# Patient Record
Sex: MALE | Race: WHITE | NOT HISPANIC OR LATINO | Employment: FULL TIME | URBAN - NONMETROPOLITAN AREA
[De-identification: names, ages, dates, MRNs, and addresses within clinical notes are randomized per-mention and may not be internally consistent; named-entity substitution may affect disease eponyms.]

---

## 2022-11-09 ENCOUNTER — LAB (OUTPATIENT)
Dept: LAB | Facility: HOSPITAL | Age: 27
End: 2022-11-09

## 2022-11-09 ENCOUNTER — OFFICE VISIT (OUTPATIENT)
Dept: FAMILY MEDICINE CLINIC | Facility: CLINIC | Age: 27
End: 2022-11-09

## 2022-11-09 VITALS
OXYGEN SATURATION: 96 % | WEIGHT: 315 LBS | HEART RATE: 137 BPM | HEIGHT: 72 IN | SYSTOLIC BLOOD PRESSURE: 135 MMHG | BODY MASS INDEX: 42.66 KG/M2 | TEMPERATURE: 98.5 F | DIASTOLIC BLOOD PRESSURE: 88 MMHG

## 2022-11-09 DIAGNOSIS — J03.90 TONSILLITIS: ICD-10-CM

## 2022-11-09 DIAGNOSIS — J03.00 STREP TONSILLITIS: Primary | ICD-10-CM

## 2022-11-09 DIAGNOSIS — R50.9 FEVER, UNSPECIFIED FEVER CAUSE: ICD-10-CM

## 2022-11-09 LAB
FLUAV AG NPH QL: NEGATIVE
FLUBV AG NPH QL IA: NEGATIVE
S PYO AG THROAT QL: POSITIVE

## 2022-11-09 PROCEDURE — 87880 STREP A ASSAY W/OPTIC: CPT

## 2022-11-09 PROCEDURE — 99203 OFFICE O/P NEW LOW 30 MIN: CPT | Performed by: NURSE PRACTITIONER

## 2022-11-09 PROCEDURE — 87804 INFLUENZA ASSAY W/OPTIC: CPT

## 2022-11-09 PROCEDURE — 96372 THER/PROPH/DIAG INJ SC/IM: CPT | Performed by: NURSE PRACTITIONER

## 2022-11-09 RX ORDER — CEFTRIAXONE 1 G/1
1 INJECTION, POWDER, FOR SOLUTION INTRAMUSCULAR; INTRAVENOUS EVERY 24 HOURS
Status: COMPLETED | OUTPATIENT
Start: 2022-11-09 | End: 2022-11-09

## 2022-11-09 RX ORDER — AMOXICILLIN AND CLAVULANATE POTASSIUM 875; 125 MG/1; MG/1
1 TABLET, FILM COATED ORAL 2 TIMES DAILY
Qty: 20 TABLET | Refills: 0 | Status: SHIPPED | OUTPATIENT
Start: 2022-11-09 | End: 2023-02-21

## 2022-11-09 RX ORDER — DEXAMETHASONE SODIUM PHOSPHATE 4 MG/ML
8 INJECTION, SOLUTION INTRA-ARTICULAR; INTRALESIONAL; INTRAMUSCULAR; INTRAVENOUS; SOFT TISSUE ONCE
Status: COMPLETED | OUTPATIENT
Start: 2022-11-09 | End: 2022-11-09

## 2022-11-09 RX ADMIN — CEFTRIAXONE 1 G: 1 INJECTION, POWDER, FOR SOLUTION INTRAMUSCULAR; INTRAVENOUS at 17:23

## 2022-11-09 RX ADMIN — DEXAMETHASONE SODIUM PHOSPHATE 8 MG: 4 INJECTION, SOLUTION INTRA-ARTICULAR; INTRALESIONAL; INTRAMUSCULAR; INTRAVENOUS; SOFT TISSUE at 17:36

## 2022-11-09 NOTE — PROGRESS NOTES
After obtaining consent, and per orders of Lisbeth BAILEY, injection of Rocephin 1gm given by Rachana Contreras LPN. Patient instructed to remain in clinic for 20 minutes afterwards, and to report any adverse reaction to me immediately. Pt tolerated well with no adverse reactions.      Patient will come to 's office to : prescription.   Patient was advised of location and hours: Yes.   Patient was advised to bring photo identification: Yes.   Patient elects another party to  item: no.

## 2022-11-09 NOTE — PROGRESS NOTES
"Chief Complaint  Sore Throat, Fever, and Generalized Body Aches    Subjective    History of Present Illness      Patient presents to [facility] for   History of Present Illness  Pt is here today with complaints of swollen tonsils, fatigue and fever that began 11/4 and ramped up significantly on 11/7.  Pt states girlfriend and daughter were tested positive for the flu last week       Review of Systems   Constitutional: Positive for fever.   HENT: Positive for sore throat.    Musculoskeletal: Positive for myalgias.       I have reviewed and agree with the HPI and ROS information as above.  ALONSO Luke     Objective   Vital Signs:   /88   Pulse (!) 137   Temp 98.5 °F (36.9 °C) (Temporal)   Ht 182.9 cm (72\")   Wt (!) 152 kg (334 lb)   SpO2 96%   BMI 45.30 kg/m²         Physical Exam  Constitutional:       Appearance: He is well-developed. He is morbidly obese.   HENT:      Head: Normocephalic and atraumatic.      Right Ear: Tympanic membrane, ear canal and external ear normal.      Left Ear: Tympanic membrane, ear canal and external ear normal.      Nose: Nose normal. No septal deviation, nasal tenderness or congestion.      Mouth/Throat:      Lips: Pink. No lesions.      Mouth: Mucous membranes are moist. No oral lesions.      Dentition: Normal dentition.      Pharynx: Oropharynx is clear. Posterior oropharyngeal erythema present. No pharyngeal swelling or oropharyngeal exudate.      Tonsils: Tonsillar exudate present. 3+ on the right. 3+ on the left.   Eyes:      General: Lids are normal. Vision grossly intact. No scleral icterus.        Right eye: No discharge.         Left eye: No discharge.      Extraocular Movements: Extraocular movements intact.      Conjunctiva/sclera: Conjunctivae normal.      Right eye: Right conjunctiva is not injected.      Left eye: Left conjunctiva is not injected.      Pupils: Pupils are equal, round, and reactive to light.   Neck:      " Thyroid: No thyroid mass.      Trachea: Trachea normal.   Cardiovascular:      Rate and Rhythm: Normal rate and regular rhythm.      Heart sounds: Normal heart sounds. No murmur heard.    No gallop.   Pulmonary:      Effort: Pulmonary effort is normal.      Breath sounds: Normal breath sounds and air entry. No wheezing, rhonchi or rales.   Abdominal:      General: There is no distension.      Palpations: Abdomen is soft. There is no mass.      Tenderness: There is no abdominal tenderness. There is no right CVA tenderness, left CVA tenderness, guarding or rebound.   Musculoskeletal:         General: No tenderness or deformity. Normal range of motion.      Cervical back: Full passive range of motion without pain, normal range of motion and neck supple.      Thoracic back: Normal.      Right lower leg: No edema.      Left lower leg: No edema.   Skin:     General: Skin is warm and dry.      Coloration: Skin is not jaundiced.      Findings: No rash.   Neurological:      Mental Status: He is alert and oriented to person, place, and time.      Cranial Nerves: Cranial nerves are intact.      Sensory: Sensation is intact.      Motor: Motor function is intact.      Coordination: Coordination is intact.      Gait: Gait is intact.      Deep Tendon Reflexes: Reflexes are normal and symmetric.   Psychiatric:         Mood and Affect: Mood and affect normal.         Judgment: Judgment normal.            PHQ-2 Depression Screening  Little interest or pleasure in doing things? 0-->not at all   Feeling down, depressed, or hopeless? 0-->not at all   PHQ-2 Total Score 0     PHQ-9 Depression Screening  Little interest or pleasure in doing things? 0-->not at all   Feeling down, depressed, or hopeless? 0-->not at all   Trouble falling or staying asleep, or sleeping too much?     Feeling tired or having little energy?     Poor appetite or overeating?     Feeling bad about yourself - or that you are a failure or have let yourself or your  family down?     Trouble concentrating on things, such as reading the newspaper or watching television?     Moving or speaking so slowly that other people could have noticed? Or the opposite - being so fidgety or restless that you have been moving around a lot more than usual?     Thoughts that you would be better off dead, or of hurting yourself in some way?     PHQ-9 Total Score 0   If you checked off any problems, how difficult have these problems made it for you to do your work, take care of things at home, or get along with other people?        Result Review  Data Reviewed:              Rapid Strep A Screen - Swab, Throat (11/09/2022 15:37)  Influenza Antigen, Rapid - Swab, Nasopharynx (11/09/2022 15:37)       Assessment and Plan      Problem List Items Addressed This Visit    None  Visit Diagnoses     Strep tonsillitis    -  Primary    Relevant Medications    dexamethasone (DECADRON) injection 8 mg (Start on 11/9/2022  4:00 PM)    cefTRIAXone (ROCEPHIN) injection 1 g (Start on 11/9/2022  4:00 PM)    amoxicillin-clavulanate (Augmentin) 875-125 MG per tablet    Fever, unspecified fever cause          Plan:   1. Rapid flu and strep: flu neg, strep positive.   2. Dex, karen IM. Follow with augmentin. GERM precautions discussed.   3. Fu tomorrow for recheck d/t severity of tonsillitis.         Follow Up   Return in about 1 day (around 11/10/2022).  Patient was given instructions and counseling regarding his condition or for health maintenance advice. Please see specific information pulled into the AVS if appropriate.

## 2022-11-09 NOTE — PROGRESS NOTES
After obtaining consent, and per orders of Lisbeth BAILEY, injection of Decadron 8mg given by Rachana Contreras LPN. Patient instructed to remain in clinic for 20 minutes afterwards, and to report any adverse reaction to me immediately. Pt tolerated well with no adverse reactions.

## 2024-07-08 ENCOUNTER — HOSPITAL ENCOUNTER (OUTPATIENT)
Facility: HOSPITAL | Age: 29
Setting detail: OBSERVATION
Discharge: HOME | End: 2024-07-09
Attending: FAMILY MEDICINE | Admitting: OTOLARYNGOLOGY
Payer: MEDICAID

## 2024-07-08 ENCOUNTER — APPOINTMENT (OUTPATIENT)
Dept: CT IMAGING | Facility: HOSPITAL | Age: 29
End: 2024-07-08
Payer: MEDICAID

## 2024-07-08 DIAGNOSIS — J36 PERITONSILLAR ABSCESS: Primary | ICD-10-CM

## 2024-07-08 LAB
ANION GAP SERPL CALCULATED.3IONS-SCNC: 11 MMOL/L (ref 5–15)
BASOPHILS # BLD AUTO: 0.03 10*3/MM3 (ref 0–0.2)
BASOPHILS NFR BLD AUTO: 0.2 % (ref 0–1.5)
BUN SERPL-MCNC: 8 MG/DL (ref 6–20)
BUN/CREAT SERPL: 11.1 (ref 7–25)
CALCIUM SPEC-SCNC: 9.6 MG/DL (ref 8.6–10.5)
CHLORIDE SERPL-SCNC: 97 MMOL/L (ref 98–107)
CO2 SERPL-SCNC: 28 MMOL/L (ref 22–29)
CREAT SERPL-MCNC: 0.72 MG/DL (ref 0.76–1.27)
D-LACTATE SERPL-SCNC: 1.5 MMOL/L (ref 0.5–2)
DEPRECATED RDW RBC AUTO: 41.7 FL (ref 37–54)
EGFRCR SERPLBLD CKD-EPI 2021: 126 ML/MIN/1.73
EOSINOPHIL # BLD AUTO: 0.02 10*3/MM3 (ref 0–0.4)
EOSINOPHIL NFR BLD AUTO: 0.1 % (ref 0.3–6.2)
ERYTHROCYTE [DISTWIDTH] IN BLOOD BY AUTOMATED COUNT: 13.6 % (ref 12.3–15.4)
GLUCOSE SERPL-MCNC: 121 MG/DL (ref 65–99)
HCT VFR BLD AUTO: 45.7 % (ref 37.5–51)
HGB BLD-MCNC: 15 G/DL (ref 13–17.7)
IMM GRANULOCYTES # BLD AUTO: 0.06 10*3/MM3 (ref 0–0.05)
IMM GRANULOCYTES NFR BLD AUTO: 0.4 % (ref 0–0.5)
LYMPHOCYTES # BLD AUTO: 1.68 10*3/MM3 (ref 0.7–3.1)
LYMPHOCYTES NFR BLD AUTO: 11.6 % (ref 19.6–45.3)
MAGNESIUM SERPL-MCNC: 2.1 MG/DL (ref 1.6–2.6)
MCH RBC QN AUTO: 27.3 PG (ref 26.6–33)
MCHC RBC AUTO-ENTMCNC: 32.8 G/DL (ref 31.5–35.7)
MCV RBC AUTO: 83.2 FL (ref 79–97)
MONOCYTES # BLD AUTO: 1.42 10*3/MM3 (ref 0.1–0.9)
MONOCYTES NFR BLD AUTO: 9.8 % (ref 5–12)
NEUTROPHILS NFR BLD AUTO: 11.31 10*3/MM3 (ref 1.7–7)
NEUTROPHILS NFR BLD AUTO: 77.9 % (ref 42.7–76)
NRBC BLD AUTO-RTO: 0 /100 WBC (ref 0–0.2)
PLATELET # BLD AUTO: 254 10*3/MM3 (ref 140–450)
PMV BLD AUTO: 9.1 FL (ref 6–12)
POTASSIUM SERPL-SCNC: 4 MMOL/L (ref 3.5–5.2)
RBC # BLD AUTO: 5.49 10*6/MM3 (ref 4.14–5.8)
SODIUM SERPL-SCNC: 136 MMOL/L (ref 136–145)
WBC NRBC COR # BLD AUTO: 14.52 10*3/MM3 (ref 3.4–10.8)

## 2024-07-08 PROCEDURE — 25010000002 DEXAMETHASONE SODIUM PHOSPHATE 100 MG/10ML SOLUTION: Performed by: FAMILY MEDICINE

## 2024-07-08 PROCEDURE — 83605 ASSAY OF LACTIC ACID: CPT | Performed by: FAMILY MEDICINE

## 2024-07-08 PROCEDURE — 99285 EMERGENCY DEPT VISIT HI MDM: CPT

## 2024-07-08 PROCEDURE — 96375 TX/PRO/DX INJ NEW DRUG ADDON: CPT

## 2024-07-08 PROCEDURE — G0378 HOSPITAL OBSERVATION PER HR: HCPCS

## 2024-07-08 PROCEDURE — 70491 CT SOFT TISSUE NECK W/DYE: CPT

## 2024-07-08 PROCEDURE — 25010000002 MORPHINE PER 10 MG: Performed by: FAMILY MEDICINE

## 2024-07-08 PROCEDURE — 25010000002 ONDANSETRON PER 1 MG: Performed by: FAMILY MEDICINE

## 2024-07-08 PROCEDURE — 25810000003 SODIUM CHLORIDE 0.9 % SOLUTION: Performed by: FAMILY MEDICINE

## 2024-07-08 PROCEDURE — 80048 BASIC METABOLIC PNL TOTAL CA: CPT | Performed by: FAMILY MEDICINE

## 2024-07-08 PROCEDURE — 25810000003 SODIUM CHLORIDE 0.9 % SOLUTION: Performed by: NURSE PRACTITIONER

## 2024-07-08 PROCEDURE — 85025 COMPLETE CBC W/AUTO DIFF WBC: CPT | Performed by: FAMILY MEDICINE

## 2024-07-08 PROCEDURE — 25010000002 CLINDAMYCIN 600 MG/50ML SOLUTION: Performed by: FAMILY MEDICINE

## 2024-07-08 PROCEDURE — 96361 HYDRATE IV INFUSION ADD-ON: CPT

## 2024-07-08 PROCEDURE — 42700 I&D ABSCESS PERITONSILLAR: CPT | Performed by: OTOLARYNGOLOGY

## 2024-07-08 PROCEDURE — 25510000001 IOPAMIDOL 61 % SOLUTION: Performed by: FAMILY MEDICINE

## 2024-07-08 PROCEDURE — 96365 THER/PROPH/DIAG IV INF INIT: CPT

## 2024-07-08 PROCEDURE — 83735 ASSAY OF MAGNESIUM: CPT | Performed by: FAMILY MEDICINE

## 2024-07-08 RX ORDER — OXYCODONE HCL 5 MG/5 ML
5 SOLUTION, ORAL ORAL EVERY 4 HOURS PRN
Status: DISCONTINUED | OUTPATIENT
Start: 2024-07-08 | End: 2024-07-09 | Stop reason: HOSPADM

## 2024-07-08 RX ORDER — ACETAMINOPHEN 325 MG/1
650 TABLET ORAL EVERY 6 HOURS PRN
Status: DISCONTINUED | OUTPATIENT
Start: 2024-07-08 | End: 2024-07-09 | Stop reason: HOSPADM

## 2024-07-08 RX ORDER — CHLORHEXIDINE GLUCONATE ORAL RINSE 1.2 MG/ML
15 SOLUTION DENTAL EVERY 12 HOURS SCHEDULED
Status: DISCONTINUED | OUTPATIENT
Start: 2024-07-08 | End: 2024-07-09 | Stop reason: HOSPADM

## 2024-07-08 RX ORDER — CLINDAMYCIN PHOSPHATE 900 MG/50ML
900 INJECTION, SOLUTION INTRAVENOUS EVERY 8 HOURS
Qty: 450 ML | Refills: 0 | Status: DISCONTINUED | OUTPATIENT
Start: 2024-07-09 | End: 2024-07-09 | Stop reason: HOSPADM

## 2024-07-08 RX ORDER — BUPROPION HYDROCHLORIDE 300 MG/1
300 TABLET ORAL EVERY MORNING
Status: ON HOLD | COMMUNITY
Start: 2024-06-11 | End: 2024-07-09

## 2024-07-08 RX ORDER — SODIUM CHLORIDE 9 MG/ML
100 INJECTION, SOLUTION INTRAVENOUS CONTINUOUS
Status: DISCONTINUED | OUTPATIENT
Start: 2024-07-08 | End: 2024-07-09 | Stop reason: HOSPADM

## 2024-07-08 RX ORDER — SODIUM CHLORIDE 0.9 % (FLUSH) 0.9 %
10 SYRINGE (ML) INJECTION AS NEEDED
Status: DISCONTINUED | OUTPATIENT
Start: 2024-07-08 | End: 2024-07-09 | Stop reason: HOSPADM

## 2024-07-08 RX ORDER — LIDOCAINE HYDROCHLORIDE AND EPINEPHRINE 10; 10 MG/ML; UG/ML
20 INJECTION, SOLUTION INFILTRATION; PERINEURAL ONCE
Status: COMPLETED | OUTPATIENT
Start: 2024-07-08 | End: 2024-07-08

## 2024-07-08 RX ORDER — CLINDAMYCIN PHOSPHATE 600 MG/50ML
600 INJECTION, SOLUTION INTRAVENOUS ONCE
Status: COMPLETED | OUTPATIENT
Start: 2024-07-08 | End: 2024-07-08

## 2024-07-08 RX ORDER — ONDANSETRON 2 MG/ML
4 INJECTION INTRAMUSCULAR; INTRAVENOUS ONCE
Status: COMPLETED | OUTPATIENT
Start: 2024-07-08 | End: 2024-07-08

## 2024-07-08 RX ADMIN — MORPHINE SULFATE 4 MG: 4 INJECTION, SOLUTION INTRAMUSCULAR; INTRAVENOUS at 20:25

## 2024-07-08 RX ADMIN — SODIUM CHLORIDE 100 ML/HR: 9 INJECTION, SOLUTION INTRAVENOUS at 21:55

## 2024-07-08 RX ADMIN — LIDOCAINE HYDROCHLORIDE,EPINEPHRINE BITARTRATE 20 ML: 10; .01 INJECTION, SOLUTION INFILTRATION; PERINEURAL at 20:56

## 2024-07-08 RX ADMIN — IOPAMIDOL 100 ML: 612 INJECTION, SOLUTION INTRAVENOUS at 19:04

## 2024-07-08 RX ADMIN — OXYCODONE HYDROCHLORIDE 5 MG: 5 SOLUTION ORAL at 23:15

## 2024-07-08 RX ADMIN — CLINDAMYCIN PHOSPHATE 600 MG: 600 INJECTION, SOLUTION INTRAVENOUS at 20:55

## 2024-07-08 RX ADMIN — DEXAMETHASONE SODIUM PHOSPHATE 12 MG: 10 INJECTION, SOLUTION INTRAMUSCULAR; INTRAVENOUS at 21:58

## 2024-07-08 RX ADMIN — ONDANSETRON 4 MG: 2 INJECTION INTRAMUSCULAR; INTRAVENOUS at 20:25

## 2024-07-08 RX ADMIN — SODIUM CHLORIDE 1000 ML: 9 INJECTION, SOLUTION INTRAVENOUS at 20:25

## 2024-07-08 NOTE — ED PROVIDER NOTES
Subjective   History of Present Illness  28-year-old male presents emergency room with difficulty swallowing.  He is having trouble taking and his secretions as well.  Patient symptoms have been going on for the last few days but worsening.  Patient was seen by his primary care provider and they diagnosed him with a tonsillar abscess and sent him here to the emergency room for evaluation.      Review of Systems   HENT:  Positive for drooling and trouble swallowing.    All other systems reviewed and are negative.      Past Medical History:   Diagnosis Date    Tingling sensation     legs and feet       No Known Allergies    Past Surgical History:   Procedure Laterality Date    ANKLE ARTHROSCOPY Right     KNEE ARTHROSCOPY Left        History reviewed. No pertinent family history.    Social History     Socioeconomic History    Marital status:    Tobacco Use    Smoking status: Never    Smokeless tobacco: Never   Vaping Use    Vaping status: Some Days   Substance and Sexual Activity    Alcohol use: Never    Drug use: Defer    Sexual activity: Defer           Objective   Physical Exam  Vitals and nursing note reviewed.   Constitutional:       Appearance: Normal appearance.   HENT:      Head: Normocephalic and atraumatic.      Mouth/Throat:      Mouth: Mucous membranes are moist.      Pharynx: Uvula swelling present.      Tonsils: Tonsillar abscess present.   Eyes:      Extraocular Movements: Extraocular movements intact.      Pupils: Pupils are equal, round, and reactive to light.   Cardiovascular:      Rate and Rhythm: Normal rate and regular rhythm.      Heart sounds: Normal heart sounds.   Pulmonary:      Effort: Pulmonary effort is normal.      Breath sounds: Normal breath sounds.   Abdominal:      General: Bowel sounds are normal.      Palpations: Abdomen is soft.      Tenderness: There is no abdominal tenderness.   Skin:     General: Skin is warm and dry.   Neurological:      General: No focal deficit present.       Mental Status: He is alert and oriented to person, place, and time.   Psychiatric:         Mood and Affect: Mood normal.         Behavior: Behavior normal.         Procedures           ED Course                                           Lab Results (last 24 hours)       Procedure Component Value Units Date/Time    CBC & Differential [[id number]]  (Abnormal) Collected: 07/08/24 1802    Specimen: Blood Updated: 07/08/24 1816    Narrative:      The following orders were created for panel order CBC & Differential.  Procedure                               Abnormality         Status                     ---------                               -----------         ------                     CBC Auto Differential[[id number]]        Abnormal            Final result                 Please view results for these tests on the individual orders.    Basic Metabolic Panel [[id number]]  (Abnormal) Collected: 07/08/24 1802    Specimen: Blood Updated: 07/08/24 1836     Glucose 121 mg/dL      BUN 8 mg/dL      Creatinine 0.72 mg/dL      Sodium 136 mmol/L      Potassium 4.0 mmol/L      Comment: Slight hemolysis detected by analyzer. Result may be falsely elevated.        Chloride 97 mmol/L      CO2 28.0 mmol/L      Calcium 9.6 mg/dL      BUN/Creatinine Ratio 11.1     Anion Gap 11.0 mmol/L      eGFR 126.0 mL/min/1.73     Narrative:      GFR Normal >60  Chronic Kidney Disease <60  Kidney Failure <15      Lactic Acid, Plasma [[id number]]  (Normal) Collected: 07/08/24 1802    Specimen: Blood Updated: 07/08/24 1830     Lactate 1.5 mmol/L     Magnesium [[id number]]  (Normal) Collected: 07/08/24 1802    Specimen: Blood Updated: 07/08/24 1833     Magnesium 2.1 mg/dL     CBC Auto Differential [[id number]]  (Abnormal) Collected: 07/08/24 1802    Specimen: Blood Updated: 07/08/24 1816     WBC 14.52 10*3/mm3      RBC 5.49 10*6/mm3      Hemoglobin 15.0 g/dL      Hematocrit 45.7 %      MCV 83.2 fL      MCH 27.3 pg      MCHC 32.8 g/dL      RDW 13.6 %       RDW-SD 41.7 fl      MPV 9.1 fL      Platelets 254 10*3/mm3      Neutrophil % 77.9 %      Lymphocyte % 11.6 %      Monocyte % 9.8 %      Eosinophil % 0.1 %      Basophil % 0.2 %      Immature Grans % 0.4 %      Neutrophils, Absolute 11.31 10*3/mm3      Lymphocytes, Absolute 1.68 10*3/mm3      Monocytes, Absolute 1.42 10*3/mm3      Eosinophils, Absolute 0.02 10*3/mm3      Basophils, Absolute 0.03 10*3/mm3      Immature Grans, Absolute 0.06 10*3/mm3      nRBC 0.0 /100 WBC           CT Soft Tissue Neck With Contrast   Final Result   1. Left tonsillar abscess measuring at least 3.3 x 3.0 x 2.2 cm with   inflammatory edematous changes extending inferiorly to the level of the   left piriform sinus. Prominent bilateral adenoidal and tonsillar   hypertrophy with the left tonsil abscess resulting in near obliteration   of the nasopharyngeal airway. Oropharyngeal airway remains patent.   Enlarged bilateral cervical lymph nodes, likely reactive given the   extent of inflammation present.   2. Opacified left maxillary sinus. 1.6 cm right maxillary mucous   retention cyst.       This report was signed and finalized on 7/8/2024 7:22 PM by Dr. Arely Jackson MD.            Medications   sodium chloride 0.9 % flush 10 mL (has no administration in time range)   dexAMETHasone (DECADRON) IVPB 12 mg (has no administration in time range)   clindamycin (CLEOCIN) 900 mg in dextrose 5% 50 mL IVPB (premix) (has no administration in time range)   oxyCODONE (ROXICODONE) 5 MG/5ML solution 5 mg (has no administration in time range)   acetaminophen (TYLENOL) tablet 650 mg (has no administration in time range)   sodium chloride 0.9 % infusion (has no administration in time range)   chlorhexidine (PERIDEX) 0.12 % solution 15 mL (has no administration in time range)   iopamidol (ISOVUE-300) 61 % injection 100 mL (100 mL Intravenous Given 7/8/24 1904)   sodium chloride 0.9 % bolus 1,000 mL (1,000 mL Intravenous New Bag 7/8/24 2025)   ondansetron  (ZOFRAN) injection 4 mg (4 mg Intravenous Given 7/8/24 2025)   morphine injection 4 mg (4 mg Intravenous Given 7/8/24 2025)   clindamycin (CLEOCIN) 600 mg in dextrose 5% 50 mL IVPB (premix) (600 mg Intravenous New Bag 7/8/24 2055)   lidocaine 1% - EPINEPHrine 1:775862 (XYLOCAINE W/EPI) 1 %-1:652516 injection 20 mL (20 mL Injection Given 7/8/24 2056)       Medical Decision Making  28-year-old male was found had a peritonsillar abscess.  Dr. Nguyễn Valenzuela has evaluated the patient here in the emergency room.  Patient will be admitted to their services at this time.  Patient was stable at time of admission.  All questions were answered for the patient prior to admission.    Problems Addressed:  Peritonsillar abscess: complicated acute illness or injury    Amount and/or Complexity of Data Reviewed  Labs: ordered. Decision-making details documented in ED Course.  Radiology: ordered. Decision-making details documented in ED Course.  Discussion of management or test interpretation with external provider(s): Dr. Tino Valenzuela, ENT on-call    Risk  Prescription drug management.  Decision regarding hospitalization.        Final diagnoses:   Peritonsillar abscess       ED Disposition  ED Disposition       ED Disposition   Decision to Admit    Condition   --    Comment   Level of Care: Med/Surg [1]   Diagnosis: Peritonsillar abscess [475.ICD-9-CM]   Admitting Physician: TINO VALENZUELA [[id number]]                 No follow-up provider specified.       Medication List      No changes were made to your prescriptions during this visit.            Alireza Zaragoza MD  07/08/24 8102

## 2024-07-08 NOTE — LETTER
July 9, 2024     Patient: Pierre Mosley   YOB: 1995   Date of Visit: 7/8/2024       To Whom It May Concern:    Pierre Mosley was admitted at [facility] 7/8/2024-7/9/2024            Sincerely,        No name on file    CC: No Recipients

## 2024-07-09 VITALS
OXYGEN SATURATION: 100 % | HEART RATE: 82 BPM | HEIGHT: 73 IN | TEMPERATURE: 98 F | DIASTOLIC BLOOD PRESSURE: 72 MMHG | WEIGHT: 280 LBS | RESPIRATION RATE: 18 BRPM | BODY MASS INDEX: 37.11 KG/M2 | SYSTOLIC BLOOD PRESSURE: 122 MMHG

## 2024-07-09 DIAGNOSIS — J36 PERITONSILLAR ABSCESS: Primary | ICD-10-CM

## 2024-07-09 PROCEDURE — 99024 POSTOP FOLLOW-UP VISIT: CPT | Performed by: NURSE PRACTITIONER

## 2024-07-09 PROCEDURE — G0378 HOSPITAL OBSERVATION PER HR: HCPCS

## 2024-07-09 PROCEDURE — 96361 HYDRATE IV INFUSION ADD-ON: CPT

## 2024-07-09 PROCEDURE — 25010000002 CLINDAMYCIN 900 MG/50ML SOLUTION: Performed by: NURSE PRACTITIONER

## 2024-07-09 PROCEDURE — 25810000003 SODIUM CHLORIDE 0.9 % SOLUTION: Performed by: NURSE PRACTITIONER

## 2024-07-09 RX ORDER — OXYCODONE HCL 5 MG/5 ML
5 SOLUTION, ORAL ORAL EVERY 4 HOURS PRN
Qty: 90 ML | Refills: 0 | Status: SHIPPED | OUTPATIENT
Start: 2024-07-09 | End: 2024-07-12

## 2024-07-09 RX ORDER — CLINDAMYCIN PALMITATE HYDROCHLORIDE 75 MG/5ML
300 SOLUTION ORAL EVERY 8 HOURS SCHEDULED
Qty: 600 ML | Refills: 0 | Status: SHIPPED | OUTPATIENT
Start: 2024-07-09 | End: 2024-07-19

## 2024-07-09 RX ORDER — CHLORHEXIDINE GLUCONATE ORAL RINSE 1.2 MG/ML
15 SOLUTION DENTAL 2 TIMES DAILY
Qty: 473 ML | Refills: 0 | Status: SHIPPED | OUTPATIENT
Start: 2024-07-09 | End: 2024-07-25

## 2024-07-09 RX ADMIN — CHLORHEXIDINE GLUCONATE 0.12% ORAL RINSE 15 ML: 1.2 LIQUID ORAL at 08:57

## 2024-07-09 RX ADMIN — SODIUM CHLORIDE 100 ML/HR: 9 INJECTION, SOLUTION INTRAVENOUS at 08:56

## 2024-07-09 RX ADMIN — CLINDAMYCIN PHOSPHATE 900 MG: 900 INJECTION, SOLUTION INTRAVENOUS at 04:58

## 2024-07-09 NOTE — DISCHARGE SUMMARY
[facility]         DISCHARGE SUMMARY    Patient Name: Pierre Mosley  : 1995  MRN: [id number]    Date of Admission: 2024  Date of Discharge:  2024  Primary Care Physician: Mauricio Deluna MD    Consults       No orders found for last 30 day(s).            Surgical Procedures Since Admission:      Presenting Problem:   Peritonsillar abscess [J36]    Active and Resolved Hospital Problems:  Active Hospital Problems    Diagnosis POA    **Peritonsillar abscess [J36] Yes      Resolved Hospital Problems   No resolved problems to display.         Hospital Course     Hospital Course:  Pierre Mosley is a 28 y.o. male who presented to the ER last night with a sore throat for 3 days. Ct revealed left peritonsillar abscess. Abscess was drained and patient was admitted overnight for IV antibiotics and fluids. This morning, patient admits significant improvement to symptoms. Voice and swelling markedly improved on examination today.     Day of Discharge     Vital Signs:  Temp:  [96.2 °F (35.7 °C)-99 °F (37.2 °C)] 98 °F (36.7 °C)  Heart Rate:  [] 82  Resp:  [16-18] 18  BP: (122-159)/(65-92) 122/72  Output by Drain (mL) 24 0701 - 24 1900 24 1901 - 24 0700 24 0701 - 24 1227 Range Total   Patient has no LDAs of requested type attached.     Physical Exam:  Physical Exam  Vitals reviewed.   Constitutional:       Appearance: Normal appearance. He is obese.      Comments: Voice markedly improved   HENT:      Head: Normocephalic.      Right Ear: External ear normal.      Left Ear: External ear normal.      Nose: Nose normal.      Mouth/Throat:        Comments: Swelling of the left tonsil markedly reduced  Neck:      Comments: Slight tenderness to left neck on palpation  Neurological:      Mental Status: He is alert.   Psychiatric:         Behavior: Behavior is cooperative.          Pertinent  and/or Most Recent Results     LAB RESULTS:      Lab 24  4063    WBC 14.52*   HEMOGLOBIN 15.0   HEMATOCRIT 45.7   PLATELETS 254   NEUTROS ABS 11.31*   IMMATURE GRANS (ABS) 0.06*   LYMPHS ABS 1.68   MONOS ABS 1.42*   EOS ABS 0.02   MCV 83.2   LACTATE 1.5         Lab 07/08/24  1802   SODIUM 136   POTASSIUM 4.0   CHLORIDE 97*   CO2 28.0   ANION GAP 11.0   BUN 8   CREATININE 0.72*   EGFR 126.0   GLUCOSE 121*   CALCIUM 9.6   MAGNESIUM 2.1               Brief Urine Lab Results       None          Microbiology Results (last 10 days)       ** No results found for the last 240 hours. **        CT Soft Tissue Neck With Contrast    Result Date: 7/8/2024  Impression: 1. Left tonsillar abscess measuring at least 3.3 x 3.0 x 2.2 cm with inflammatory edematous changes extending inferiorly to the level of the left piriform sinus. Prominent bilateral adenoidal and tonsillar hypertrophy with the left tonsil abscess resulting in near obliteration of the nasopharyngeal airway. Oropharyngeal airway remains patent. Enlarged bilateral cervical lymph nodes, likely reactive given the extent of inflammation present. 2. Opacified left maxillary sinus. 1.6 cm right maxillary mucous retention cyst.  This report was signed and finalized on 7/8/2024 7:22 PM by Dr. Arely Jackson MD.       Labs Pending at Discharge:      Discharge Details        Discharge Medications        New Medications        Instructions Start Date   chlorhexidine 0.12 % solution  Commonly known as: PERIDEX   Swish and spit 15 mL by mouth 2 (Two) Times a Day for 7 days.      clindamycin 75 MG/5ML solution  Commonly known as: Cleocin   300 mg, Oral, Every 8 Hours Scheduled      oxyCODONE 5 MG/5ML solution  Commonly known as: ROXICODONE   5 mg, Oral, Every 4 Hours PRN             Continue These Medications        Instructions Start Date   ibuprofen 200 MG tablet  Commonly known as: ADVIL,MOTRIN   200 mg, Oral, Every 6 Hours PRN      multivitamin with minerals tablet tablet   1 tablet, Oral, Daily               No Known  Allergies      Discharge Disposition:      Diet:  Hospital:  Diet Order   Procedures    Diet: Regular/House; Texture: Soft to Chew (NDD 3); Soft to Chew: Whole Meat; Fluid Consistency: Thin (IDDSI 0)       Discharge Activity:   Clindamycin PO  Roxicodone, tylenol, motrin for pain control  Peridex  Will f/u in clinic in 2 weeks    No future appointments.        Time spent on Discharge including face to face service:  10 minutes    ALONSO Bernstein

## 2024-07-09 NOTE — H&P
[facility]   PREOPERATIVE HISTORY AND PHYSICAL    Patient Name:Pierre Mosley  : 1995  MRN: [id number]  Primary Care Physician: Mauricio Deluna MD  Date of admission: 2024    Subjective   Subjective     Chief Complaint: preoperative evaluation    HPI  Pierre Mosley is a 28 y.o. male who presents for preoperative evaluation. He presents with a sore throat x 3 days as well as dysphagia. He states that this has progressively worsened. He has not been treated with antibiotics or steroids. He has not eaten since yesterday and states that he is unable to swallow secretions.      Personal History     Past Medical History:   Diagnosis Date   • Tingling sensation     legs and feet       Past Surgical History:   Procedure Laterality Date   • ANKLE ARTHROSCOPY Right    • KNEE ARTHROSCOPY Left        Family History: His family history is not on file.     Social History: He  reports that he has never smoked. He has never used smokeless tobacco. Drug use questions deferred to the physician. He reports that he does not drink alcohol.    Home Medications:  buPROPion XL, ibuprofen, and multivitamin with minerals    Allergies:  He has No Known Allergies.    Objective    Objective     Vitals:    Temp:  [99 °F (37.2 °C)] 99 °F (37.2 °C)  Heart Rate:  [105] 105  Resp:  [18] 18  BP: (156)/(92) 156/92  ENT Physical Exam  Constitutional  Appearance: patient appears well-developed,  Communication/Voice: Communication comments: muffled voice  Head and Face  Appearance: face appears normal;  Ear  Ear Canals: right ear canal normal; left ear canal normal;  Nose  External Nose: external nose normal;  Oral Cavity/Oropharynx  Lips: normal;  OC/OP comments: Significant swelling to the left posterior op and left tonsil with uvula shift  Neck  Neck: neck tenderness present;  Neck comments: Tenderness to left neck on palpation  Respiratory  Inspection: breathing unlabored; normal breathing rate;  Cardiovascular  Auscultation:  regular rate and rhythm;  Drawings       Assessment & Plan   Assessment / Plan     Brief Patient Summary:  Pierre Mosley is a 28 y.o. male who presents for preoperative evaluation. He presents with a progressively worsening sore throat for 3 days. He is having difficulty with swallowing foods and secretions. We will start IV clindamycin and steroids. Plan to I & D left tonsil abscess. NPO at midnight for possible surgical intervention in AM.    * No surgery found *    Active Hospital Problems:  There are no active hospital problems to display for this patient.    Plan:     IV clindamycin  Decadron  Pain control  NPO at midnight    PERITONSILLAR ABSCESS I&D: An I&D of peritonsillar space was recommended. The risks and benefits were explained including but not limited to pain, early and late bleeding, infection, risks of the anesthesia, progression to a formal abscess, possible need for repeat I&D and possible need for tonsillectomy. Alternatives were discussed. The patient/parents understood these risks. Questions were asked appropriately answered. No guarantees were made or implied.      Loi Frederick, APRN

## 2024-07-09 NOTE — PROGRESS NOTES
OPERATIVE NOTE  7/8/2024    NAME: Pierre Mosley    YOB: 1995  MRN: [id number]    PRE-OPERATIVE DIAGNOSIS:    Left peritonsillar abscess    POST-OPERATIVE DIAGNOSIS:   Same    PROCEDURE PERFORMED:   Incision and drainage of left peritonsillar abscess    SURGEON:   Chris Valenzuela MD    ASSISTANT(S):   None    ANESTHESIA:   Local Anesthesia with 1% Xylocaine with Epinephrine 1:100,000    INDICATIONS: The patient is a 28 y.o. male with * No surgery found *    PROCEDURE:  The patient in the emergency department was given Local Anesthesia with 2 cc of 1% Xylocaine with Epinephrine 1:100,000, which was injected into the area of fluctuance in the left peritonsillar area superior to the superior pole of the left tonsil and the patient prepped and draped in the usual manner.     Patient was able to open widely and an 11 blade was utilized to incise the mucosa and approximately 10 to 15 cc of gross purulence obtained which was suctioned immediately.  Minimal bleeding was encountered.  Patient felt significant immediate improvement.  The procedure was terminated.     The patient tolerated the procedure well.    SPECIMENS:  None        COMPLICATIONS: NONE    ESTIMATED BLOOD LOSS:  Minimal    Chris Valenzuela MD  7/8/2024

## 2024-07-09 NOTE — ED NOTES
"Nursing report ED to floor  Pierre Mosley  28 y.o.  male    HPI:   Chief Complaint   Patient presents with    Abscess       Admitting doctor:   Chris Valenzuela MD    Consulting provider(s):  Consults       No orders found from 6/9/2024 to 7/9/2024.             Admitting diagnosis:   There were no encounter diagnoses.    Code status:   Current Code Status       Date Active Code Status Order ID Comments User Context       Not on file            Allergies:   Patient has no known allergies.    Intake and Output  No intake or output data in the 24 hours ending 07/08/24 2106    Weight:       07/08/24  1711   Weight: 127 kg (279 lb)       Most recent vitals:   Vitals:    07/08/24 1711   BP: 156/92   BP Location: Right arm   Patient Position: Sitting   Pulse: 105   Resp: 18   Temp: 99 °F (37.2 °C)   TempSrc: Oral   SpO2: 98%   Weight: 127 kg (279 lb)   Height: 185.4 cm (73\")     Oxygen Therapy: .    Active LDAs/IV Access:   Lines, Drains & Airways       Active LDAs       Name Placement date Placement time Site Days    Peripheral IV Anterior;Distal;Right;Upper Arm --  --  Arm  --                    Labs (abnormal labs have a star):   Labs Reviewed   BASIC METABOLIC PANEL - Abnormal; Notable for the following components:       Result Value    Glucose 121 (*)     Creatinine 0.72 (*)     Chloride 97 (*)     All other components within normal limits    Narrative:     GFR Normal >60  Chronic Kidney Disease <60  Kidney Failure <15     CBC WITH AUTO DIFFERENTIAL - Abnormal; Notable for the following components:    WBC 14.52 (*)     Neutrophil % 77.9 (*)     Lymphocyte % 11.6 (*)     Eosinophil % 0.1 (*)     Neutrophils, Absolute 11.31 (*)     Monocytes, Absolute 1.42 (*)     Immature Grans, Absolute 0.06 (*)     All other components within normal limits   LACTIC ACID, PLASMA - Normal   MAGNESIUM - Normal   CBC AND DIFFERENTIAL    Narrative:     The following orders were created for panel order CBC & Differential.  Procedure   "                             Abnormality         Status                     ---------                               -----------         ------                     CBC Auto Differential[[id number]]        Abnormal            Final result                 Please view results for these tests on the individual orders.       Meds given in ED:   Medications   sodium chloride 0.9 % flush 10 mL (has no administration in time range)   clindamycin (CLEOCIN) 600 mg in dextrose 5% 50 mL IVPB (premix) (600 mg Intravenous New Bag 7/8/24 2055)   dexAMETHasone (DECADRON) IVPB 12 mg (has no administration in time range)   clindamycin (CLEOCIN) 900 mg in dextrose 5% 50 mL IVPB (premix) (has no administration in time range)   oxyCODONE (ROXICODONE) 5 MG/5ML solution 5 mg (has no administration in time range)   acetaminophen (TYLENOL) tablet 650 mg (has no administration in time range)   sodium chloride 0.9 % infusion (has no administration in time range)   iopamidol (ISOVUE-300) 61 % injection 100 mL (100 mL Intravenous Given 7/8/24 1904)   sodium chloride 0.9 % bolus 1,000 mL (1,000 mL Intravenous New Bag 7/8/24 2025)   ondansetron (ZOFRAN) injection 4 mg (4 mg Intravenous Given 7/8/24 2025)   morphine injection 4 mg (4 mg Intravenous Given 7/8/24 2025)   lidocaine 1% - EPINEPHrine 1:934584 (XYLOCAINE W/EPI) 1 %-1:986777 injection 20 mL (20 mL Injection Given 7/8/24 2056)     sodium chloride, 100 mL/hr         NIH Stroke Scale:       Isolation/Infection(s):  No active isolations   No active infections     COVID Testing  Collected .  Resulted .    Nursing report ED to floor:  Mental status: .  Ambulatory status: .  Precautions: .    ED nurse phone extentsion- ..

## 2024-07-23 NOTE — PROGRESS NOTES
YOB: 1995  Location: [facility]  Location Address: [address] [place] [address], [facility], [address] [place] [address]  Location Phone: [phone]    Chief Complaint   Patient presents with    Peritonsillar abcess       History of Present Illness  Pierre Mosley is a 29 y.o. male.  Pierre Mosley is here for follow up of ENT complaints. The patient has had problems with left peritonsillar abscess.  This was drained in the ER by Dr. Valenzuela on 2024.  He also has history of recurrent tonsillitis. Last year, he had 7 tonsillitis. He snores nightly. He denies rash with tonsil infection.      Past Medical History:   Diagnosis Date    Dental disease 2018    periodontal disease    Nosebleed     Seizures     Only had 1    Tingling sensation     legs and feet       Past Surgical History:   Procedure Laterality Date    ANKLE ARTHROSCOPY Right     EYE SURGERY      Lasik surgery    KNEE ARTHROSCOPY Left        No outpatient medications have been marked as taking for the 24 encounter (Office Visit) with Loi Frederick APRN.       Patient has no known allergies.    Family History   Problem Relation Age of Onset    Hypertension Mother     Osteoarthritis Mother     Thyroid disease Mother     Rashes / Skin problems Mother     Diabetes Father     Hypertension Father     Rashes / Skin problems Sister        Social History     Socioeconomic History    Marital status:    Tobacco Use    Smoking status: Some Days     Current packs/day: 0.00     Types: Cigarettes     Last attempt to quit: 2020     Years since quittin.2    Smokeless tobacco: Never   Vaping Use    Vaping status: Some Days   Substance and Sexual Activity    Alcohol use: Not Currently     Comment: Drink only soically    Drug use: Not Currently     Types: Methamphetamines    Sexual activity: Defer       Review of Systems   Constitutional: Negative.    HENT:          Admits recurrent tonsillitis   Respiratory: Negative.         Vitals:     07/24/24 1527   Pulse: 80   Temp: 98 °F (36.7 °C)       Body mass index is 36.95 kg/m².    Objective     Physical Exam  Vitals reviewed.   Constitutional:       Appearance: Normal appearance. He is normal weight.   HENT:      Head: Normocephalic.      Right Ear: External ear normal.      Left Ear: External ear normal.      Nose: Nose normal.      Mouth/Throat:      Lips: Pink.      Mouth: Mucous membranes are moist.      Tonsils: 3+ on the right. 3+ on the left.      Comments: Cryptic tonsil  Musculoskeletal:      Cervical back: Full passive range of motion without pain.   Neurological:      Mental Status: He is alert.   Psychiatric:         Behavior: Behavior is cooperative.         Assessment & Plan   Diagnoses and all orders for this visit:    1. Recurrent tonsillitis (Primary)  -     Case Request; Standing  -     CBC (No Diff); Future  -     Protime-INR; Future  -     APTT; Future  -     Case Request    2. History of peritonsillar abscess  -     Case Request; Standing  -     CBC (No Diff); Future  -     Protime-INR; Future  -     APTT; Future  -     Case Request    3. Enlarged tonsils  -     Case Request; Standing  -     CBC (No Diff); Future  -     Protime-INR; Future  -     APTT; Future  -     Case Request    4. Cryptic tonsil  -     Case Request; Standing  -     CBC (No Diff); Future  -     Protime-INR; Future  -     APTT; Future  -     Case Request    Other orders  -     Follow Anesthesia Guidelines / Protocol; Future  -     Obtain Informed Consent  -     Follow Anesthesia Guidelines / Protocol; Standing      TONSILLECTOMY (Bilateral)  Orders Placed This Encounter   Procedures    CBC (No Diff)     Standing Status:   Future     Standing Expiration Date:   7/24/2025     Order Specific Question:   Release to patient     Answer:   Routine Release [[id number]]    Protime-INR     Standing Status:   Future     Standing Expiration Date:   7/24/2025     Order Specific Question:   Release to patient     Answer:    Routine Release [[id number]]    APTT     Standing Status:   Future     Standing Expiration Date:   7/24/2025     Order Specific Question:   Release to patient     Answer:   Routine Release [[id number]]    Obtain Informed Consent     Order Specific Question:   Informed Consent Given For     Answer:   TONSILLECTOMY     TONSILLECTOMY: A tonsillectomy was recommended. The risks and benefits were explained including but not limited to pain, early and late bleeding, infection, risks of the general anesthesia, dysphagia and poor PO intake, and voice change/VPI. Alternatives were discussed. The patient/parents understood these risks. Questions were asked appropriately answered. No guarantees were made or implied.       Return for post op.       Patient Instructions   TONSILLECTOMY: A tonsillectomy was recommended. The risks and benefits were explained including but not limited to pain, early and late bleeding, infection, risks of the general anesthesia, dysphagia and poor PO intake, and voice change/VPI. Alternatives were discussed. The patient/parents understood these risks. Questions were asked appropriately answered. No guarantees were made or implied.

## 2024-07-24 ENCOUNTER — OFFICE VISIT (OUTPATIENT)
Dept: OTOLARYNGOLOGY | Facility: CLINIC | Age: 29
End: 2024-07-24
Payer: MEDICAID

## 2024-07-24 VITALS — HEIGHT: 73 IN | BODY MASS INDEX: 37.11 KG/M2 | TEMPERATURE: 98 F | WEIGHT: 280 LBS | HEART RATE: 80 BPM

## 2024-07-24 DIAGNOSIS — J35.1 ENLARGED TONSILS: ICD-10-CM

## 2024-07-24 DIAGNOSIS — J03.91 RECURRENT TONSILLITIS: Primary | ICD-10-CM

## 2024-07-24 DIAGNOSIS — J35.8 CRYPTIC TONSIL: ICD-10-CM

## 2024-07-24 DIAGNOSIS — Z87.09 HISTORY OF PERITONSILLAR ABSCESS: ICD-10-CM

## 2024-07-24 NOTE — PATIENT INSTRUCTIONS
TONSILLECTOMY: A tonsillectomy was recommended. The risks and benefits were explained including but not limited to pain, early and late bleeding, infection, risks of the general anesthesia, dysphagia and poor PO intake, and voice change/VPI. Alternatives were discussed. The patient/parents understood these risks. Questions were asked appropriately answered. No guarantees were made or implied.

## 2024-07-25 PROBLEM — Z87.09 HISTORY OF PERITONSILLAR ABSCESS: Status: ACTIVE | Noted: 2024-07-24

## 2024-07-25 PROBLEM — J03.91 RECURRENT TONSILLITIS: Status: ACTIVE | Noted: 2024-07-24

## 2024-07-25 PROBLEM — J35.8 CRYPTIC TONSIL: Status: ACTIVE | Noted: 2024-07-24

## 2024-07-25 PROBLEM — J35.1 ENLARGED TONSILS: Status: ACTIVE | Noted: 2024-07-24

## 2024-09-03 ENCOUNTER — PRE-ADMISSION TESTING (OUTPATIENT)
Dept: PREADMISSION TESTING | Facility: HOSPITAL | Age: 29
End: 2024-09-03
Payer: MEDICAID

## 2024-09-03 VITALS
WEIGHT: 283.07 LBS | RESPIRATION RATE: 18 BRPM | HEART RATE: 92 BPM | SYSTOLIC BLOOD PRESSURE: 154 MMHG | OXYGEN SATURATION: 98 % | BODY MASS INDEX: 38.34 KG/M2 | DIASTOLIC BLOOD PRESSURE: 96 MMHG | HEIGHT: 72 IN

## 2024-09-03 DIAGNOSIS — J03.91 RECURRENT TONSILLITIS: ICD-10-CM

## 2024-09-03 DIAGNOSIS — J35.1 ENLARGED TONSILS: ICD-10-CM

## 2024-09-03 DIAGNOSIS — J35.8 CRYPTIC TONSIL: ICD-10-CM

## 2024-09-03 DIAGNOSIS — Z87.09 HISTORY OF PERITONSILLAR ABSCESS: ICD-10-CM

## 2024-09-03 LAB
APTT PPP: 25.7 SECONDS (ref 24.5–36)
DEPRECATED RDW RBC AUTO: 41.7 FL (ref 37–54)
ERYTHROCYTE [DISTWIDTH] IN BLOOD BY AUTOMATED COUNT: 13.4 % (ref 12.3–15.4)
HCT VFR BLD AUTO: 41.8 % (ref 37.5–51)
HGB BLD-MCNC: 13.6 G/DL (ref 13–17.7)
INR PPP: 0.94 (ref 0.91–1.09)
MCH RBC QN AUTO: 27.3 PG (ref 26.6–33)
MCHC RBC AUTO-ENTMCNC: 32.5 G/DL (ref 31.5–35.7)
MCV RBC AUTO: 83.9 FL (ref 79–97)
PLATELET # BLD AUTO: 216 10*3/MM3 (ref 140–450)
PMV BLD AUTO: 9.2 FL (ref 6–12)
PROTHROMBIN TIME: 12.9 SECONDS (ref 11.8–14.8)
RBC # BLD AUTO: 4.98 10*6/MM3 (ref 4.14–5.8)
WBC NRBC COR # BLD AUTO: 7.42 10*3/MM3 (ref 3.4–10.8)

## 2024-09-03 PROCEDURE — 85730 THROMBOPLASTIN TIME PARTIAL: CPT

## 2024-09-03 PROCEDURE — 85027 COMPLETE CBC AUTOMATED: CPT

## 2024-09-03 PROCEDURE — 36415 COLL VENOUS BLD VENIPUNCTURE: CPT

## 2024-09-03 PROCEDURE — 85610 PROTHROMBIN TIME: CPT

## 2024-09-03 NOTE — DISCHARGE INSTRUCTIONS
Preparing for Surgery  Follow these instructions before the procedure:  Several days or weeks before your procedure      Ask your health care provider about:  Changing or stopping your regular medicines. This is especially important if you are taking diabetes medicines or blood thinners.  Taking medicines such as aspirin and ibuprofen. These medicines can thin your blood. Do not take these medicines unless your health care provider tells you to take them.  Taking over-the-counter medicines, vitamins, herbs, and supplements.    Contact your surgeon if you:  Develop a fever of more than 100.4°F (38°C) or other feelings of illness during the 48 hours before your surgery.  Have symptoms that get worse.  Have questions or concerns about your surgery.  If you are going home the same day of your surgery you will need to arrange for a responsible adult, age 18 years old or older, to drive you home from the hospital and stay with you for 24 hours. Verification of the  will be made prior to any procedure requiring sedation. You may not go home in a taxi or any form of public transportation by yourself.     Day before your procedure  Medication(s) you need to stop the day before your surgery:PER MD    24 hours before your procedure DO NOT drink alcoholic beverages or smoke.  24 hours before your procedure STOP taking Erectile Dysfunction medication (i.e.,Cialis, Viagra)   You may be asked to shower with a germ-killing soap.  Day of your procedure   You may take the following medication(s) the morning of surgery with a sip of water: PER MD      8 hours before your scheduled arrival time, STOP all food, any dairy products, and full liquids. This includes hard candy, chewing gum or mints. This is extremely important to prevent serious complications.     Up to 2 hours before your scheduled arrival time, you may have clear liquids no cream, powder, or pulp of any kind. Safe options are water, black coffee, plain tea, soda,  Gatorade/Powerade, clear broth, apple juice.    2 hours before your scheduled arrival time, STOP drinking clear liquids.    You may need to take another shower with a germ-killing soap before you leave home in the morning. Do not use perfumes, colognes, or body lotions.  Wear comfortable loose-fitting clothing.  Remove all jewelry including body piercing and rings, dark colored nail polish, and make up prior to arrival at the hospital. Leave all valuables at home.   Bring your hearing aids if you rely on them.  Do not wear contact lenses. If you wear eyeglasses remember to bring a case to store them in while you are in surgery.  Do not use denture adhesives since you will be asked to remove them during your surgery.    You do not need to bring your home medications into the hospital.   Bring your sleep apnea device with you on the day of your surgery (if this applies to you).  If you wear portable oxygen, bring it with you.   If you are staying overnight, you may bring a bag of items you may need such as slippers, robe and a change of clothes for your discharge. You may want to leave these items in the car until you are ready for them since your family will take your belongings when you leave the pre-operative area.  Arrive at the hospital as scheduled by the office. You will be asked to arrive 2 hours prior to your surgery time in order to prepare for your procedure.  When you arrive at the hospital  Go to the registration desk located at the main entrance of the hospital.  After registration is completed, you will be given a beeper and a sticker sheet. Take the stickers to Outpatient Surgery and place in the tray at the end of the desk to notify the staff that you have arrived and registered.   Return to the lobby to wait. You are not always called back according to the time of arrival but rather the time your doctor will be ready.  When your beeper lights up and vibrates proceed through the double doors, under  the stairs, and a member of the Outpatient Surgery staff will escort you to your preoperative room.       __________________________________________________________________________________________________________________________                                                                                                                                  [facility]                                                                                                                    [address]                                                                                                                                      [place] [address]                                                                                                                POSTOPERATIVE TONISLLECTOMY INSTRUCTIONS    Tonsillectomy is an outpatient surgical procedure performed under general anesthesia. The surgery lasts between 30-45 minutes. Normally, the patient will remain at the hospital for several hours after surgery for observation. Children 4 and under or with severe obstructive sleep apnea are usually admitted overnight for close monitoring. If a patient has severe bleeding, vomiting or low oxygen status, an overnight stay will be required. Most children take 10 days to recover from surgery. Older children, teens and adults typically take a little longer, closer to 12-14 days. No follow up visit is required unless the patient has a postop bleed. A staff member will call around 3 weeks after surgery to discuss recovery.    WHEN THE PATIENT COMES HOME    If the patient goes home and has postoperative bleeding that cannot be stopped within 15 minutes of gargling or drinking ice water, the patient needs to report to [facility] ER. In addition, it is imperative that patients drink after surgery. If a patient is not drinking, not urinating 2-3 times a day, crying without tear  production or has dry tongue/mucous membranes, they will need to call the physician or present to the [facility] ER for fluids.    Please start drinking immediately after surgery, except for alcohol, purple or red fluids. The patient may have nausea and vomiting after surgery, but this should resolve within 24 hours after anesthesia wears off.    Minimum fluid intake for the first 24 hours after surgery by weight    Weight                      Minimal fluid intake    Over 20 lbs.                 34 ounces    Over 30 lbs.                 42 ounces    Over 40 lbs.                 50 ounces    Over 50 lbs.                 58 ounces    Over 60 lbs.                 68 ounces    EATING    A soft diet is recommended during the recovery period. Tonsillectomy patients may be reluctant to eat due to pain: therefore, weight loss may occur. Do not force patients to eat; as long as they are drinking an appropriate fluid intake, eating is not mandatory. Have foods such as popsicles, pudding, slushies, macaroni, and mashed potatoes available if patient feels like eating. Please note that increased mucous will occur with dairy intake.    FEVER    A very common cause of fever in the postop tonsillectomy patient is dehydration. Encourage fluid intake with ice chips, cold or room temperature liquids and popsicles. A low-grade fever may be observed the night of surgery and for a few days after. When the patient starts to lose scabs of throat, they may spike a temperature. Treat fever with ibuprofen, Tylenol, and tepid baths.    IF A FEVER OF GREATER THAN 102 CONTINUES, CALL THE PHYSICIAN AS THIS MAY NOT BE FROM SURGERY.    PAIN    Pain after a tonsillectomy may be mild to severe. The pain will be in the throat and the patient will have referred pain to the ears. Ear pain is common and normal. Patient may also have neck and jaw pain. You can use a warm compress for ear and jaw pain. You may use a cold compress or ice wrap around the  neck for throat and neck pain. Please do not use heat or warm compresses on the neck/throat.    Patients often sleep with their mouth open after a tonsillectomy. The tonsil beds will dry out because of mouth breathing so pain will be worse if they wake up during night and in the morning. Please have patient drink when they are ready for bed and when they wake up in the morning. A cool mist vaporizer at night beside the bed may help with these symptoms.    PAIN CONTROL    The physician will prescribe liquid oxycodone for pain control. Pain medication should be given as prescribed. It is recommended that you give Tylenol or Ibuprofen when the patient wakes up, give them soft food and then in 30 minutes give ½ dose of pain medication. Let them continue to eat or drink and give the other ½ dose of pain medication. This prevents nausea and vomiting from taking pain medication on an empty stomach.    You may supplement pain medication with ibuprofen Ice packs and cold liquids can reduce pain as well. Narcotic pain medications can cause itching. If itching occurs, you may take Benadryl. Call the office or report to ER if symptoms involve swelling of throat or respiratory compromise.    BLEEDING    With the exception of small specks of blood from the nose or in the saliva, bright red blood should not be seen. If bleeding occurs, have patient gargle ice water and take note of color when they spit it out. If there is red color in the water being spit out, continue to gargle and spit until water being spit out is clear. If patient swallows blood, they will vomit. In addition, if blood is in the stomach, it will look like dark spicules describe as “coffee grounds”. If bleeding does not stop within 15 minutes, the patient will need to report to [facility] ER.    SCABS    A scab will form where the tonsils and adenoids were removed. The scabs are thick, white, and have a foul smell. THIS IS NORMAL. When the scabs come off,  the patient will have an increase in pain, develop a fever, and have increased ear and throat pain. The scabs will start coming off around day 5 and continue until around day 10. A white coating may be in mouth and on tongue that resembles thrush but it is NOT thrush. Patient may rinse with saltwater, 1 tsp in 8 Oz of water, and spit water out 2-3 times a day. The uvula may become swollen due to surgery and equipment used. Cold fluids and ice chips can help symptoms and this should resolve in a few days. If the uvula restricts breathing, call the office or report to the ER.    NAUSEA and CONSTIPATION    Nausea and vomiting 24-48 hours post-op is often caused by general anesthesia and should resolve when anesthesia is metabolized and eliminated from body. If you feel the patient’s stomach upset is from pain medication, give medication with food and in divided doses over 20-30 minutes. If patient is on an antibiotic, eat 2-3 servings of live culture yogurt or give probiotic. If constipation develops, increase fluids. Patients may take a stool softener or laxative.    BREATHING    Snoring or mouth breathing may occur after surgery due to swelling in the throat. Normal breathing should return 10-14 days after surgery. Nasal congestion, nasal drainage, cough and excessive mucous may also occur.    ACTIVITY    Activity should be limited for 14 days following surgery. No strenuous physical activity or contact sports will not be allowed for 2 weeks. Patients may return to school before 2 weeks but with the aforementioned restrictions. Travel within the 2-week postoperative period away from the area your physician covers is not recommended.

## 2024-09-09 ENCOUNTER — ANESTHESIA (OUTPATIENT)
Dept: PERIOP | Facility: HOSPITAL | Age: 29
End: 2024-09-09
Payer: MEDICAID

## 2024-09-09 ENCOUNTER — ANESTHESIA EVENT (OUTPATIENT)
Dept: PERIOP | Facility: HOSPITAL | Age: 29
End: 2024-09-09
Payer: MEDICAID

## 2024-09-09 ENCOUNTER — HOSPITAL ENCOUNTER (OUTPATIENT)
Facility: HOSPITAL | Age: 29
Setting detail: HOSPITAL OUTPATIENT SURGERY
Discharge: HOME | End: 2024-09-09
Attending: OTOLARYNGOLOGY | Admitting: OTOLARYNGOLOGY
Payer: MEDICAID

## 2024-09-09 VITALS
SYSTOLIC BLOOD PRESSURE: 140 MMHG | OXYGEN SATURATION: 94 % | DIASTOLIC BLOOD PRESSURE: 98 MMHG | RESPIRATION RATE: 16 BRPM | TEMPERATURE: 97 F | HEART RATE: 75 BPM

## 2024-09-09 DIAGNOSIS — J35.8 CRYPTIC TONSIL: ICD-10-CM

## 2024-09-09 DIAGNOSIS — Z87.09 HISTORY OF PERITONSILLAR ABSCESS: ICD-10-CM

## 2024-09-09 DIAGNOSIS — J36 PERITONSILLAR ABSCESS: Primary | ICD-10-CM

## 2024-09-09 DIAGNOSIS — J35.1 ENLARGED TONSILS: ICD-10-CM

## 2024-09-09 DIAGNOSIS — J03.91 RECURRENT TONSILLITIS: ICD-10-CM

## 2024-09-09 PROCEDURE — 25810000003 LACTATED RINGERS PER 1000 ML: Performed by: ANESTHESIOLOGY

## 2024-09-09 PROCEDURE — 88304 TISSUE EXAM BY PATHOLOGIST: CPT | Performed by: OTOLARYNGOLOGY

## 2024-09-09 PROCEDURE — 25010000002 DEXAMETHASONE PER 1 MG: Performed by: NURSE ANESTHETIST, CERTIFIED REGISTERED

## 2024-09-09 PROCEDURE — 42826 REMOVAL OF TONSILS: CPT | Performed by: OTOLARYNGOLOGY

## 2024-09-09 PROCEDURE — 25010000002 DROPERIDOL PER 5 MG: Performed by: NURSE ANESTHETIST, CERTIFIED REGISTERED

## 2024-09-09 PROCEDURE — 25010000002 FENTANYL CITRATE (PF) 50 MCG/ML SOLUTION: Performed by: NURSE ANESTHETIST, CERTIFIED REGISTERED

## 2024-09-09 PROCEDURE — 25010000002 PROPOFOL 10 MG/ML EMULSION: Performed by: NURSE ANESTHETIST, CERTIFIED REGISTERED

## 2024-09-09 PROCEDURE — 25010000002 ONDANSETRON PER 1 MG: Performed by: NURSE ANESTHETIST, CERTIFIED REGISTERED

## 2024-09-09 PROCEDURE — 25810000003 LACTATED RINGERS PER 1000 ML: Performed by: OTOLARYNGOLOGY

## 2024-09-09 RX ORDER — MIDAZOLAM HYDROCHLORIDE 2 MG/2ML
1 INJECTION, SOLUTION INTRAMUSCULAR; INTRAVENOUS
Status: DISCONTINUED | OUTPATIENT
Start: 2024-09-09 | End: 2024-09-09 | Stop reason: HOSPADM

## 2024-09-09 RX ORDER — DROPERIDOL 2.5 MG/ML
INJECTION, SOLUTION INTRAMUSCULAR; INTRAVENOUS AS NEEDED
Status: DISCONTINUED | OUTPATIENT
Start: 2024-09-09 | End: 2024-09-09 | Stop reason: SURG

## 2024-09-09 RX ORDER — DEXMEDETOMIDINE HYDROCHLORIDE 4 UG/ML
INJECTION, SOLUTION INTRAVENOUS AS NEEDED
Status: DISCONTINUED | OUTPATIENT
Start: 2024-09-09 | End: 2024-09-09 | Stop reason: SURG

## 2024-09-09 RX ORDER — IBUPROFEN 600 MG/1
600 TABLET, FILM COATED ORAL EVERY 6 HOURS PRN
Status: DISCONTINUED | OUTPATIENT
Start: 2024-09-09 | End: 2024-09-09 | Stop reason: HOSPADM

## 2024-09-09 RX ORDER — FENTANYL CITRATE 50 UG/ML
INJECTION, SOLUTION INTRAMUSCULAR; INTRAVENOUS AS NEEDED
Status: DISCONTINUED | OUTPATIENT
Start: 2024-09-09 | End: 2024-09-09 | Stop reason: SURG

## 2024-09-09 RX ORDER — ATRACURIUM BESYLATE 10 MG/ML
INJECTION, SOLUTION INTRAVENOUS AS NEEDED
Status: DISCONTINUED | OUTPATIENT
Start: 2024-09-09 | End: 2024-09-09 | Stop reason: SURG

## 2024-09-09 RX ORDER — ONDANSETRON 2 MG/ML
INJECTION INTRAMUSCULAR; INTRAVENOUS AS NEEDED
Status: DISCONTINUED | OUTPATIENT
Start: 2024-09-09 | End: 2024-09-09 | Stop reason: SURG

## 2024-09-09 RX ORDER — HYDROCODONE BITARTRATE AND ACETAMINOPHEN 5; 325 MG/1; MG/1
1 TABLET ORAL EVERY 4 HOURS PRN
Status: DISCONTINUED | OUTPATIENT
Start: 2024-09-09 | End: 2024-09-09 | Stop reason: HOSPADM

## 2024-09-09 RX ORDER — LABETALOL HYDROCHLORIDE 5 MG/ML
5 INJECTION, SOLUTION INTRAVENOUS
Status: DISCONTINUED | OUTPATIENT
Start: 2024-09-09 | End: 2024-09-09 | Stop reason: HOSPADM

## 2024-09-09 RX ORDER — LIDOCAINE HYDROCHLORIDE 20 MG/ML
INJECTION, SOLUTION EPIDURAL; INFILTRATION; INTRACAUDAL; PERINEURAL AS NEEDED
Status: DISCONTINUED | OUTPATIENT
Start: 2024-09-09 | End: 2024-09-09 | Stop reason: SURG

## 2024-09-09 RX ORDER — FENTANYL CITRATE 50 UG/ML
50 INJECTION, SOLUTION INTRAMUSCULAR; INTRAVENOUS
Status: DISCONTINUED | OUTPATIENT
Start: 2024-09-09 | End: 2024-09-09 | Stop reason: HOSPADM

## 2024-09-09 RX ORDER — MAGNESIUM HYDROXIDE 1200 MG/15ML
LIQUID ORAL AS NEEDED
Status: DISCONTINUED | OUTPATIENT
Start: 2024-09-09 | End: 2024-09-09 | Stop reason: HOSPADM

## 2024-09-09 RX ORDER — SODIUM CHLORIDE, SODIUM LACTATE, POTASSIUM CHLORIDE, CALCIUM CHLORIDE 600; 310; 30; 20 MG/100ML; MG/100ML; MG/100ML; MG/100ML
100 INJECTION, SOLUTION INTRAVENOUS CONTINUOUS
Status: DISCONTINUED | OUTPATIENT
Start: 2024-09-09 | End: 2024-09-09 | Stop reason: HOSPADM

## 2024-09-09 RX ORDER — OXYCODONE HCL 5 MG/5 ML
0.05 SOLUTION, ORAL ORAL EVERY 4 HOURS PRN
Qty: 75 ML | Refills: 0 | Status: SHIPPED | OUTPATIENT
Start: 2024-09-09 | End: 2024-09-13

## 2024-09-09 RX ORDER — DEXAMETHASONE SODIUM PHOSPHATE 4 MG/ML
INJECTION, SOLUTION INTRA-ARTICULAR; INTRALESIONAL; INTRAMUSCULAR; INTRAVENOUS; SOFT TISSUE AS NEEDED
Status: DISCONTINUED | OUTPATIENT
Start: 2024-09-09 | End: 2024-09-09

## 2024-09-09 RX ORDER — ONDANSETRON 2 MG/ML
4 INJECTION INTRAMUSCULAR; INTRAVENOUS ONCE AS NEEDED
Status: DISCONTINUED | OUTPATIENT
Start: 2024-09-09 | End: 2024-09-09 | Stop reason: HOSPADM

## 2024-09-09 RX ORDER — FLUMAZENIL 0.1 MG/ML
0.2 INJECTION INTRAVENOUS AS NEEDED
Status: DISCONTINUED | OUTPATIENT
Start: 2024-09-09 | End: 2024-09-09 | Stop reason: HOSPADM

## 2024-09-09 RX ORDER — DROPERIDOL 2.5 MG/ML
0.62 INJECTION, SOLUTION INTRAMUSCULAR; INTRAVENOUS ONCE AS NEEDED
Status: DISCONTINUED | OUTPATIENT
Start: 2024-09-09 | End: 2024-09-09 | Stop reason: HOSPADM

## 2024-09-09 RX ORDER — SCOLOPAMINE TRANSDERMAL SYSTEM 1 MG/1
1 PATCH, EXTENDED RELEASE TRANSDERMAL ONCE
Status: DISCONTINUED | OUTPATIENT
Start: 2024-09-09 | End: 2024-09-09 | Stop reason: HOSPADM

## 2024-09-09 RX ORDER — DEXAMETHASONE SODIUM PHOSPHATE 4 MG/ML
INJECTION, SOLUTION INTRA-ARTICULAR; INTRALESIONAL; INTRAMUSCULAR; INTRAVENOUS; SOFT TISSUE AS NEEDED
Status: DISCONTINUED | OUTPATIENT
Start: 2024-09-09 | End: 2024-09-09 | Stop reason: SURG

## 2024-09-09 RX ORDER — LIDOCAINE HYDROCHLORIDE 10 MG/ML
0.5 INJECTION, SOLUTION EPIDURAL; INFILTRATION; INTRACAUDAL; PERINEURAL ONCE AS NEEDED
Status: DISCONTINUED | OUTPATIENT
Start: 2024-09-09 | End: 2024-09-09 | Stop reason: HOSPADM

## 2024-09-09 RX ORDER — MIDAZOLAM HYDROCHLORIDE 2 MG/2ML
2 INJECTION, SOLUTION INTRAMUSCULAR; INTRAVENOUS ONCE
Status: DISCONTINUED | OUTPATIENT
Start: 2024-09-09 | End: 2024-09-09 | Stop reason: HOSPADM

## 2024-09-09 RX ORDER — SODIUM CHLORIDE 0.9 % (FLUSH) 0.9 %
3 SYRINGE (ML) INJECTION AS NEEDED
Status: DISCONTINUED | OUTPATIENT
Start: 2024-09-09 | End: 2024-09-09 | Stop reason: HOSPADM

## 2024-09-09 RX ORDER — SODIUM CHLORIDE 0.9 % (FLUSH) 0.9 %
3 SYRINGE (ML) INJECTION EVERY 12 HOURS SCHEDULED
Status: DISCONTINUED | OUTPATIENT
Start: 2024-09-09 | End: 2024-09-09 | Stop reason: HOSPADM

## 2024-09-09 RX ORDER — SODIUM CHLORIDE 0.9 % (FLUSH) 0.9 %
3-10 SYRINGE (ML) INJECTION AS NEEDED
Status: DISCONTINUED | OUTPATIENT
Start: 2024-09-09 | End: 2024-09-09 | Stop reason: HOSPADM

## 2024-09-09 RX ORDER — SODIUM CHLORIDE, SODIUM LACTATE, POTASSIUM CHLORIDE, CALCIUM CHLORIDE 600; 310; 30; 20 MG/100ML; MG/100ML; MG/100ML; MG/100ML
1000 INJECTION, SOLUTION INTRAVENOUS CONTINUOUS
Status: DISCONTINUED | OUTPATIENT
Start: 2024-09-09 | End: 2024-09-09 | Stop reason: HOSPADM

## 2024-09-09 RX ORDER — ACETAMINOPHEN 500 MG
1000 TABLET ORAL ONCE
Status: COMPLETED | OUTPATIENT
Start: 2024-09-09 | End: 2024-09-09

## 2024-09-09 RX ORDER — SODIUM CHLORIDE 9 MG/ML
40 INJECTION, SOLUTION INTRAVENOUS AS NEEDED
Status: DISCONTINUED | OUTPATIENT
Start: 2024-09-09 | End: 2024-09-09 | Stop reason: HOSPADM

## 2024-09-09 RX ORDER — ONDANSETRON 4 MG/1
4 TABLET, ORALLY DISINTEGRATING ORAL ONCE AS NEEDED
Status: DISCONTINUED | OUTPATIENT
Start: 2024-09-09 | End: 2024-09-09 | Stop reason: HOSPADM

## 2024-09-09 RX ORDER — NALOXONE HCL 0.4 MG/ML
0.4 VIAL (ML) INJECTION AS NEEDED
Status: DISCONTINUED | OUTPATIENT
Start: 2024-09-09 | End: 2024-09-09 | Stop reason: HOSPADM

## 2024-09-09 RX ORDER — OXYCODONE HCL 5 MG/5 ML
3.5 SOLUTION, ORAL ORAL EVERY 6 HOURS PRN
Status: DISCONTINUED | OUTPATIENT
Start: 2024-09-09 | End: 2024-09-09 | Stop reason: HOSPADM

## 2024-09-09 RX ORDER — PROPOFOL 10 MG/ML
VIAL (ML) INTRAVENOUS AS NEEDED
Status: DISCONTINUED | OUTPATIENT
Start: 2024-09-09 | End: 2024-09-09 | Stop reason: SURG

## 2024-09-09 RX ORDER — HYDROCODONE BITARTRATE AND ACETAMINOPHEN 10; 325 MG/1; MG/1
1 TABLET ORAL EVERY 4 HOURS PRN
Status: DISCONTINUED | OUTPATIENT
Start: 2024-09-09 | End: 2024-09-09 | Stop reason: HOSPADM

## 2024-09-09 RX ADMIN — SODIUM CHLORIDE, POTASSIUM CHLORIDE, SODIUM LACTATE AND CALCIUM CHLORIDE 1000 ML: 600; 310; 30; 20 INJECTION, SOLUTION INTRAVENOUS at 05:59

## 2024-09-09 RX ADMIN — DEXMEDETOMIDINE HYDROCHLORIDE 10 MCG: 4 INJECTION, SOLUTION INTRAVENOUS at 08:05

## 2024-09-09 RX ADMIN — HYDROCODONE BITARTRATE AND ACETAMINOPHEN 1 TABLET: 10; 325 TABLET ORAL at 09:58

## 2024-09-09 RX ADMIN — LIDOCAINE HYDROCHLORIDE 200 MG: 20 INJECTION, SOLUTION EPIDURAL; INFILTRATION; INTRACAUDAL; PERINEURAL at 07:39

## 2024-09-09 RX ADMIN — PROPOFOL 200 MG: 10 INJECTION, EMULSION INTRAVENOUS at 07:31

## 2024-09-09 RX ADMIN — ONDANSETRON 8 MG: 2 INJECTION INTRAMUSCULAR; INTRAVENOUS at 07:29

## 2024-09-09 RX ADMIN — DROPERIDOL 1.25 MG: 2.5 INJECTION, SOLUTION INTRAMUSCULAR; INTRAVENOUS at 07:29

## 2024-09-09 RX ADMIN — SODIUM CHLORIDE, POTASSIUM CHLORIDE, SODIUM LACTATE AND CALCIUM CHLORIDE: 600; 310; 30; 20 INJECTION, SOLUTION INTRAVENOUS at 07:29

## 2024-09-09 RX ADMIN — ACETAMINOPHEN 1000 MG: 500 TABLET, FILM COATED ORAL at 06:49

## 2024-09-09 RX ADMIN — FENTANYL CITRATE 200 MCG: 50 INJECTION, SOLUTION INTRAMUSCULAR; INTRAVENOUS at 07:31

## 2024-09-09 RX ADMIN — DEXMEDETOMIDINE HYDROCHLORIDE 20 MCG: 4 INJECTION, SOLUTION INTRAVENOUS at 07:29

## 2024-09-09 RX ADMIN — SCOPALAMINE 1 PATCH: 1 PATCH, EXTENDED RELEASE TRANSDERMAL at 06:49

## 2024-09-09 RX ADMIN — DEXAMETHASONE SODIUM PHOSPHATE 12 MG: 4 INJECTION, SOLUTION INTRA-ARTICULAR; INTRALESIONAL; INTRAMUSCULAR; INTRAVENOUS; SOFT TISSUE at 07:31

## 2024-09-09 RX ADMIN — ATRACURIUM BESYLATE 15 MG: 10 INJECTION, SOLUTION INTRAVENOUS at 07:31

## 2024-09-09 NOTE — DISCHARGE INSTRUCTIONS
[facility]                                                                                                                    [address], [address]                                                                                                                                      [place] [address]                                                                                                                POSTOPERATIVE TONISLLECTOMY INSTRUCTIONS    Tonsillectomy is an outpatient surgical procedure performed under general anesthesia. The surgery lasts between 30-45 minutes. Normally, the patient will remain at the hospital for several hours after surgery for observation. Children 4 and under or with severe obstructive sleep apnea are usually admitted overnight for close monitoring. If a patient has severe bleeding, vomiting or low oxygen status, an overnight stay will be required. Most children take 10 days to recover from surgery. Older children, teens and adults typically take a little longer, closer to 12-14 days. No follow up visit is required unless the patient has a postop bleed. A staff member will call around 3 weeks after surgery to discuss recovery.    WHEN THE PATIENT COMES HOME    If the patient goes home and has postoperative bleeding that cannot be stopped within 15 minutes of gargling or drinking ice water, the patient needs to report to [facility] ER. In addition, it is imperative that patients drink after surgery. If a patient is not drinking, not urinating 2-3 times a day, crying without tear production or has dry tongue/mucous membranes, they will need to call the physician or present to the [facility] ER for fluids.    Please start drinking immediately after surgery, except for alcohol, purple or red fluids. The patient may  have nausea and vomiting after surgery, but this should resolve within 24 hours after anesthesia wears off.    Minimum fluid intake for the first 24 hours after surgery by weight    Weight                      Minimal fluid intake    Over 20 lbs.                 34 ounces    Over 30 lbs.                 42 ounces    Over 40 lbs.                 50 ounces    Over 50 lbs.                 58 ounces    Over 60 lbs.                 68 ounces    EATING    A soft diet is recommended during the recovery period. Tonsillectomy patients may be reluctant to eat due to pain: therefore, weight loss may occur. Do not force patients to eat; as long as they are drinking an appropriate fluid intake, eating is not mandatory. Have foods such as popsicles, pudding, slushies, macaroni, and mashed potatoes available if patient feels like eating. Please note that increased mucous will occur with dairy intake.    FEVER    A very common cause of fever in the postop tonsillectomy patient is dehydration. Encourage fluid intake with ice chips, cold or room temperature liquids and popsicles. A low-grade fever may be observed the night of surgery and for a few days after. When the patient starts to lose scabs of throat, they may spike a temperature. Treat fever with ibuprofen, Tylenol, and tepid baths.    IF A FEVER OF GREATER THAN 102 CONTINUES, CALL THE PHYSICIAN AS THIS MAY NOT BE FROM SURGERY.    PAIN    Pain after a tonsillectomy may be mild to severe. The pain will be in the throat and the patient will have referred pain to the ears. Ear pain is common and normal. Patient may also have neck and jaw pain. You can use a warm compress for ear and jaw pain. You may use a cold compress or ice wrap around the neck for throat and neck pain. Please do not use heat or warm compresses on the neck/throat.    Patients often sleep with their mouth open after a tonsillectomy. The tonsil beds will dry out because of mouth breathing so pain will be worse  if they wake up during night and in the morning. Please have patient drink when they are ready for bed and when they wake up in the morning. A cool mist vaporizer at night beside the bed may help with these symptoms.    PAIN CONTROL    The physician will prescribe liquid oxycodone for pain control. Pain medication should be given as prescribed. It is recommended that you give Tylenol or Ibuprofen when the patient wakes up, give them soft food and then in 30 minutes give ½ dose of pain medication. Let them continue to eat or drink and give the other ½ dose of pain medication. This prevents nausea and vomiting from taking pain medication on an empty stomach.    You may supplement pain medication with ibuprofen Ice packs and cold liquids can reduce pain as well. Narcotic pain medications can cause itching. If itching occurs, you may take Benadryl. Call the office or report to ER if symptoms involve swelling of throat or respiratory compromise.    BLEEDING    With the exception of small specks of blood from the nose or in the saliva, bright red blood should not be seen. If bleeding occurs, have patient gargle ice water and take note of color when they spit it out. If there is red color in the water being spit out, continue to gargle and spit until water being spit out is clear. If patient swallows blood, they will vomit. In addition, if blood is in the stomach, it will look like dark spicules describe as “coffee grounds”. If bleeding does not stop within 15 minutes, the patient will need to report to [facility] ER.    SCABS    A scab will form where the tonsils and adenoids were removed. The scabs are thick, white, and have a foul smell. THIS IS NORMAL. When the scabs come off, the patient will have an increase in pain, develop a fever, and have increased ear and throat pain. The scabs will start coming off around day 5 and continue until around day 10. A white coating may be in mouth and on tongue that resembles  thrush but it is NOT thrush. Patient may rinse with saltwater, 1 tsp in 8 Oz of water, and spit water out 2-3 times a day. The uvula may become swollen due to surgery and equipment used. Cold fluids and ice chips can help symptoms and this should resolve in a few days. If the uvula restricts breathing, call the office or report to the ER.    NAUSEA and CONSTIPATION    Nausea and vomiting 24-48 hours post-op is often caused by general anesthesia and should resolve when anesthesia is metabolized and eliminated from body. If you feel the patient’s stomach upset is from pain medication, give medication with food and in divided doses over 20-30 minutes. If patient is on an antibiotic, eat 2-3 servings of live culture yogurt or give probiotic. If constipation develops, increase fluids. Patients may take a stool softener or laxative.    BREATHING    Snoring or mouth breathing may occur after surgery due to swelling in the throat. Normal breathing should return 10-14 days after surgery. Nasal congestion, nasal drainage, cough and excessive mucous may also occur.    ACTIVITY    Activity should be limited for 14 days following surgery. No strenuous physical activity or contact sports will not be allowed for 2 weeks. Patients may return to school before 2 weeks but with the aforementioned restrictions. Travel within the 2-week postoperative period away from the area your physician covers is not recommended.

## 2024-09-09 NOTE — H&P
YOB: 1995  Location: [facility]  Location Address: [address] [place] [address], [facility] [address], [address] [place] [address]  Location Phone: [phone]         Chief Complaint   Patient presents with    Peritonsillar abcess         History of Present Illness  Pierre Mosley is a 29 y.o. male.  Pierre Mosley is here for follow up of ENT complaints. The patient has had problems with left peritonsillar abscess.  This was drained in the ER by Dr. Valenzuela on 2024.  He also has history of recurrent tonsillitis. Last year, he had 7 tonsillitis. He snores nightly. He denies rash with tonsil infection.      Medical History        Past Medical History:   Diagnosis Date    Dental disease 2018     periodontal disease    Nosebleed     Seizures      Only had 1    Tingling sensation       legs and feet            Surgical History         Past Surgical History:   Procedure Laterality Date    ANKLE ARTHROSCOPY Right      EYE SURGERY   2011     Lasik surgery    KNEE ARTHROSCOPY Left              Medications Taking   No outpatient medications have been marked as taking for the 24 encounter (Office Visit) with Loi Frederick APRN.            Patient has no known allergies.           Family History   Problem Relation Age of Onset    Hypertension Mother      Osteoarthritis Mother      Thyroid disease Mother      Rashes / Skin problems Mother      Diabetes Father      Hypertension Father      Rashes / Skin problems Sister           Social History   Social History            Socioeconomic History    Marital status:    Tobacco Use    Smoking status: Some Days       Current packs/day: 0.00       Types: Cigarettes       Last attempt to quit: 2020       Years since quittin.2    Smokeless tobacco: Never   Vaping Use    Vaping status: Some Days   Substance and Sexual Activity    Alcohol use: Not Currently       Comment: Drink only soically    Drug use: Not Currently       Types: Methamphetamines    Sexual  activity: Defer            Review of Systems   Constitutional: Negative.    HENT:          Admits recurrent tonsillitis   Respiratory: Negative.               Vitals:     07/24/24 1527   Pulse: 80   Temp: 98 °F (36.7 °C)         Body mass index is 36.95 kg/m².        Objective  Physical Exam  Vitals reviewed.   Constitutional:       Appearance: Normal appearance. He is normal weight.   HENT:      Head: Normocephalic.      Right Ear: External ear normal.      Left Ear: External ear normal.      Nose: Nose normal.      Mouth/Throat:      Lips: Pink.      Mouth: Mucous membranes are moist.      Tonsils: 3+ on the right. 3+ on the left.      Comments: Cryptic tonsil  Musculoskeletal:      Cervical back: Full passive range of motion without pain.   Neurological:      Mental Status: He is alert.   Psychiatric:         Behavior: Behavior is cooperative.                  Assessment & Plan  Diagnoses and all orders for this visit:     1. Recurrent tonsillitis (Primary)  -     Case Request; Standing  -     CBC (No Diff); Future  -     Protime-INR; Future  -     APTT; Future  -     Case Request     2. History of peritonsillar abscess  -     Case Request; Standing  -     CBC (No Diff); Future  -     Protime-INR; Future  -     APTT; Future  -     Case Request     3. Enlarged tonsils  -     Case Request; Standing  -     CBC (No Diff); Future  -     Protime-INR; Future  -     APTT; Future  -     Case Request     4. Cryptic tonsil  -     Case Request; Standing  -     CBC (No Diff); Future  -     Protime-INR; Future  -     APTT; Future  -     Case Request     Other orders  -     Follow Anesthesia Guidelines / Protocol; Future  -     Obtain Informed Consent  -     Follow Anesthesia Guidelines / Protocol; Standing        TONSILLECTOMY (Bilateral)        Orders Placed This Encounter   Procedures    CBC (No Diff)       Standing Status:   Future       Standing Expiration Date:   7/24/2025       Order Specific Question:   Release to  patient       Answer:   Routine Release [[id number]]    Protime-INR       Standing Status:   Future       Standing Expiration Date:   7/24/2025       Order Specific Question:   Release to patient       Answer:   Routine Release [[id number]]    APTT       Standing Status:   Future       Standing Expiration Date:   7/24/2025       Order Specific Question:   Release to patient       Answer:   Routine Release [[id number]]    Obtain Informed Consent       Order Specific Question:   Informed Consent Given For       Answer:   TONSILLECTOMY      TONSILLECTOMY: A tonsillectomy was recommended. The risks and benefits were explained including but not limited to pain, early and late bleeding, infection, risks of the general anesthesia, dysphagia and poor PO intake, and voice change/VPI. Alternatives were discussed. The patient/parents understood these risks. Questions were asked appropriately answered. No guarantees were made or implied.        Return for post op.           Patient Instructions   TONSILLECTOMY: A tonsillectomy was recommended. The risks and benefits were explained including but not limited to pain, early and late bleeding, infection, risks of the general anesthesia, dysphagia and poor PO intake, and voice change/VPI. Alternatives were discussed. The patient/parents understood these risks. Questions were asked appropriately answered. No guarantees were made or implied.

## 2024-09-09 NOTE — ANESTHESIA PREPROCEDURE EVALUATION
Anesthesia Evaluation     Patient summary reviewed and Nursing notes reviewed   no history of anesthetic complications:   NPO Solid Status: > 8 hours  NPO Liquid Status: > 2 hours           Airway   Mallampati: I  TM distance: >3 FB  Neck ROM: full  Dental - normal exam     Pulmonary    (+) a smoker Current,  Cardiovascular - negative cardio ROS  Exercise tolerance: good (4-7 METS)        Neuro/Psych- negative ROS  GI/Hepatic/Renal/Endo - negative ROS     Musculoskeletal (-) negative ROS    Abdominal    Substance History - negative use     OB/GYN negative ob/gyn ROS         Other - negative ROS       ROS/Med Hx Other: PAT Nursing Notes unavailable.               Anesthesia Plan    ASA 2     general     intravenous induction     Anesthetic plan, risks, benefits, and alternatives have been provided, discussed and informed consent has been obtained with: patient.    CODE STATUS:

## 2024-09-09 NOTE — ANESTHESIA PROCEDURE NOTES
Airway  Urgency: elective    Date/Time: 9/9/2024 7:31 AM  Airway not difficult    General Information and Staff    Patient location during procedure: OR  CRNA/CAA: Betito Aguero CRNA    Indications and Patient Condition  Indications for airway management: airway protection    Preoxygenated: yes  MILS maintained throughout  Mask difficulty assessment: 1 - vent by mask    Final Airway Details  Final airway type: endotracheal airway      Successful airway: ETT  Cuffed: yes   Successful intubation technique: direct laryngoscopy  Endotracheal tube insertion site: oral  Blade: Baca  Blade size: 2  ETT size (mm): 7.5  Cormack-Lehane Classification: grade I - full view of glottis  Placement verified by: chest auscultation and capnometry   Cuff volume (mL): 5  Measured from: lips  ETT/EBT  to lips (cm): 20  Number of attempts at approach: 1  Assessment: lips, teeth, and gum same as pre-op and atraumatic intubation

## 2024-09-09 NOTE — OP NOTE
Operative Note    Pierre Mosley  9/9/2024    Pre-op Diagnosis:   Recurrent tonsillitis [J03.91]  History of peritonsillar abscess [Z87.09]  Enlarged tonsils [J35.1]  Cryptic tonsil [J35.8]    Post-op Diagnosis:     Recurrent tonsillitis [J03.91]  History of peritonsillar abscess [Z87.09]  Enlarged tonsils [J35.1]  Cryptic tonsil [J35.8]    Procedure/CPT® Codes:  TONSILLECTOMY    Surgeon(s):  Chris Valenzuela MD    Anesthesia:   GETA    Estimated Blood Loss:   minimal    Drains:   None    Findings:   as below    Complications:  None    Procedure Description:  The patient was taken back to the operating room, placed in the supine position and under general endotracheal anesthesia the patient was prepped and draped in the usual fashion.      A Hector-Radha gag was placed into the oral cavity, retracted to the open position and suspended from a Pierre stand.  A #8 red rubber Arreaga catheter was placed per the right naris, brought out the oral cavity retracting the uvula superiorly.  A curved Allis tenaculum was utilized to grasp the left tonsil and retracting it medially it was dissected from its attachments to the palatoglossal and palatopharyngeal folds as well as the tonsillar fossa utilizing coblation.  Minimal bleeding was encountered, which was controlled with coblation on coagulation mode.  When the left tonsil had been delivered, it was submitted for pathology and attention turned to the right tonsil where a similar procedure was performed with similar findings.    Indirect visualization of the nasopharynx was undertaken.  No significant adenoid hypertrophy was noted.    The gag was relaxed for several moments and the oral cavity inspected for further bleeding.  None was appreciated and a Sealk wound dressing was fashioned to fit in subsequently placed over both of the tonsillar fossa's without difficulty.  The procedure was then terminated.  The patient tolerated the procedure well without complications.    Upon extubation the patient was subsequently transported to the Post Anesthesia Care Unit in stable condition.       Chris Valenzuela MD     Date: 9/9/2024  Time: 07:48 CDT

## 2024-09-09 NOTE — ANESTHESIA POSTPROCEDURE EVALUATION
Patient: Pierre Moslye    Procedure Summary       Date: 09/09/24 Room / Location:  [facility] OR  /  [facility] OR    Anesthesia Start: 0729 Anesthesia Stop: 0815    Procedure: TONSILLECTOMY (Bilateral: Throat) Diagnosis:       Recurrent tonsillitis      History of peritonsillar abscess      Enlarged tonsils      Cryptic tonsil      (Recurrent tonsillitis [J03.91])      (History of peritonsillar abscess [Z87.09])      (Enlarged tonsils [J35.1])      (Cryptic tonsil [J35.8])    Surgeons: Chris Valenzuela MD Provider: Betito Aguero CRNA    Anesthesia Type: general ASA Status: 2            Anesthesia Type: general    Vitals  Vitals Value Taken Time   /90 09/09/24 0841   Temp 97.4 °F (36.3 °C) 09/09/24 0816   Pulse 74 09/09/24 0842   Resp 18 09/09/24 0830   SpO2 94 % 09/09/24 0842   Vitals shown include unfiled device data.        Post Anesthesia Care and Evaluation    Patient location during evaluation: PACU  Patient participation: complete - patient participated  Level of consciousness: awake and alert  Pain management: adequate    Airway patency: patent  Anesthetic complications: No anesthetic complications    Cardiovascular status: acceptable  Respiratory status: acceptable  Hydration status: acceptable    Comments: Blood pressure 156/99, pulse 78, temperature 97.4 °F (36.3 °C), temperature source Temporal, resp. rate 18, SpO2 98%.    Pt discharged from PACU based on eben score >8

## 2024-09-10 LAB
LAB AP CASE REPORT: NORMAL
Lab: NORMAL
PATH REPORT.FINAL DX SPEC: NORMAL
PATH REPORT.GROSS SPEC: NORMAL

## 2024-09-13 ENCOUNTER — HOSPITAL ENCOUNTER (EMERGENCY)
Facility: HOSPITAL | Age: 29
Discharge: HOME | End: 2024-09-13
Payer: MEDICAID

## 2024-09-13 VITALS
HEIGHT: 73 IN | HEART RATE: 90 BPM | SYSTOLIC BLOOD PRESSURE: 126 MMHG | RESPIRATION RATE: 16 BRPM | OXYGEN SATURATION: 95 % | TEMPERATURE: 98 F | WEIGHT: 263 LBS | BODY MASS INDEX: 34.85 KG/M2 | DIASTOLIC BLOOD PRESSURE: 86 MMHG

## 2024-09-13 DIAGNOSIS — R58 BLEEDING: ICD-10-CM

## 2024-09-13 DIAGNOSIS — G89.18 POST-OP PAIN: Primary | ICD-10-CM

## 2024-09-13 DIAGNOSIS — Z90.89 S/P TONSILLECTOMY: Primary | ICD-10-CM

## 2024-09-13 PROCEDURE — 99282 EMERGENCY DEPT VISIT SF MDM: CPT

## 2024-09-13 RX ORDER — HYDROCODONE BITARTRATE AND ACETAMINOPHEN 7.5; 325 MG/15ML; MG/15ML
10 SOLUTION ORAL EVERY 4 HOURS PRN
Qty: 180 ML | Refills: 0 | Status: SHIPPED | OUTPATIENT
Start: 2024-09-13 | End: 2024-09-16

## 2024-09-13 NOTE — PROGRESS NOTES
Patient called requesting pain medication refill, family picking up unable to get to [facility] retail pharmacy before close, request [facility].

## 2024-09-13 NOTE — DISCHARGE INSTRUCTIONS
It was very nice to meet you, Pierre. Thank you for allowing us to take care of you today at [facility].    Today you were seen in the emergency department for your symptoms. Please understand that an ER evaluation is just the start of your evaluation. We do the best we can, but we are often unable to fully find what is causing your symptoms from one evaluation.  Because of this, the goal is to determine whether you need to be evaluated in the hospital or if it is safe for you to go home and see other doctors provided such as primary care physicians or specialist on an outpatient basis.     Like we discussed, I strongly urge that you follow up with your primary care doctor and ENT. Please call their office to set up an appointment as soon as possible so that you can be re-evaluated for improvement in your symptoms or for any other questions.  I have provided the information needed, including phone number, to call to set up an appointment below in these discharge papers.     Educational material has also been provided in the following pages regarding what we have discussed today.  Please try ice water gargles if you begin bleeding again.    Please return to the emergency room within 12-48 hours if you experience symptoms such as the following:   Fever, chills, chest pain or shortness of breath, pain with inspiration/expiration, pain that travels to your arms, neck or back, nausea, vomiting, severe headache, tearing pain in your chest, dizziness, feel as though you are about to pass out, OR if you have any worsening symptoms, or any other concerns.

## 2024-09-13 NOTE — ED PROVIDER NOTES
Subjective   History of Present Illness  Patient is a 29-year-old male that presents to the emergency department for possible postop complication.  Patient reports he had a tonsillectomy performed by Dr. Valenzuela on 9/9/24.  Patient states overall he has been healing well with no significant complications.  He states that this afternoon after a coughing spell he began experiencing bleeding.  Patient states bleeding lasted for about 15 minutes which caused him to report to the ED.  Patient states that he did not try anything to stop bleeding prior to reporting to the ED and has not contacted ENT office.  Patient reports upon arrival to the ED the bleeding had subsided and since he has been here he has not had any other episodes of bleeding.  Patient reports he does still have pain from surgery and reports that the right side of his throat has been more problematic than the left but overall he has no other complaints.  He denies any fevers, body aches, chills.  Denies any lightheadedness, dizziness, blurred vision, headache or near syncope.  Denies any chest pain or shortness of breath.  Denies any abdominal pain, nausea, vomiting, constipation or diarrhea.  Patient reports while eating is still difficult due to painful swallowing he has been able to tolerate soft foods and liquids without significant difficulty.          Review of Systems   All other systems reviewed and are negative.      Past Medical History:   Diagnosis Date    Arthritis     Dental disease 2018    periodontal disease    Nosebleed 2006    Seizures 2000    Only had 1    Tingling sensation     legs and feet       No Known Allergies    Past Surgical History:   Procedure Laterality Date    ANKLE ARTHROSCOPY Right     EYE SURGERY  2011    Lasik surgery    KNEE ARTHROSCOPY Left     TONSILLECTOMY Bilateral 9/9/2024    Procedure: TONSILLECTOMY;  Surgeon: Chris Valenzuela MD;  Location: [facility];  Service: ENT;  Laterality: Bilateral;       Family History    Problem Relation Age of Onset    Hypertension Mother     Osteoarthritis Mother     Thyroid disease Mother     Rashes / Skin problems Mother     Diabetes Father     Hypertension Father     Rashes / Skin problems Sister        Social History     Socioeconomic History    Marital status:    Tobacco Use    Smoking status: Some Days     Current packs/day: 0.00     Types: Cigarettes     Last attempt to quit: 2020     Years since quittin.4    Smokeless tobacco: Never   Vaping Use    Vaping status: Every Day    Substances: Nicotine   Substance and Sexual Activity    Alcohol use: Yes     Comment: Drink only soically    Drug use: Not Currently     Types: Methamphetamines    Sexual activity: Defer           Objective   Physical Exam  Vitals and nursing note reviewed.   Constitutional:       Appearance: Normal appearance.      Comments: Nontoxic appearing. In no acute distress.    HENT:      Head: Normocephalic and atraumatic.      Right Ear: External ear normal.      Left Ear: External ear normal.      Nose: Nose normal.      Mouth/Throat:      Lips: Pink.      Mouth: Mucous membranes are moist.      Pharynx: Oropharynx is clear. Uvula midline.      Comments: There does appear to be slight swelling noted to posterior pharynx.  Scabs from recent surgery are still intact with no active bleeding noted.  Patient is able to swallow and tolerate secretions without difficulty.  Eyes:      Extraocular Movements: Extraocular movements intact.      Conjunctiva/sclera: Conjunctivae normal.      Pupils: Pupils are equal, round, and reactive to light.   Cardiovascular:      Rate and Rhythm: Normal rate and regular rhythm.      Pulses: Normal pulses.      Heart sounds: Normal heart sounds.   Pulmonary:      Effort: Pulmonary effort is normal. No respiratory distress.      Breath sounds: Normal breath sounds. No wheezing.   Chest:      Chest wall: No tenderness.   Abdominal:      General: Bowel sounds are normal. There is no  distension.      Palpations: Abdomen is soft.      Tenderness: There is no abdominal tenderness. There is no right CVA tenderness, left CVA tenderness, guarding or rebound.   Musculoskeletal:         General: Normal range of motion.      Cervical back: Normal range of motion and neck supple.      Right lower leg: No edema.      Left lower leg: No edema.   Skin:     General: Skin is warm and dry.      Capillary Refill: Capillary refill takes less than 2 seconds.   Neurological:      General: No focal deficit present.      Mental Status: He is alert and oriented to person, place, and time. Mental status is at baseline.   Psychiatric:         Mood and Affect: Mood normal.         Behavior: Behavior normal.         Thought Content: Thought content normal.         Judgment: Judgment normal.         Procedures           ED Course                                             Medical Decision Making  Pierre Mosley is a 29 y.o. male who presents to the ED for possible postop complication.  Patient reports he had a tonsillectomy performed by Dr. Valenzuela on 9/9/24.  Patient states overall he has been healing well with no significant complications.  He states that this afternoon after a coughing spell he began experiencing bleeding.  Patient states bleeding lasted for about 15 minutes which caused him to report to the ED.  Patient states that he did not try anything to stop bleeding prior to reporting to the ED and has not contacted ENT office.  Patient reports upon arrival to the ED the bleeding had subsided and since he has been here he has not had any other episodes of bleeding.  Patient reports he does still have pain from surgery and reports that the right side of his throat has been more problematic than the left but overall he has no other complaints.  He denies any fevers, body aches, chills.  Denies any lightheadedness, dizziness, blurred vision, headache or near syncope.  Denies any chest pain or shortness of breath.   Denies any abdominal pain, nausea, vomiting, constipation or diarrhea.  Patient reports while eating is still difficult due to painful swallowing he has been able to tolerate soft foods and liquids without significant difficulty.    Patient was non-toxic appearing on arrival. No acute distress was noted.  Vital signs stable.     Patient's presentation raises suspicion for differentials including, but not limited to, postoperative hemorrhage, postoperative complication.    Past medical history, surgical history, and medication regimen reviewed.     Previous notes, labs, imaging, and more reviewed.    Given findings described above, patient's presentation is likely consistent with postop bleeding.      I had an in-depth discussion with the patient as well as significant other that physical exam findings are reassuring as patient has no active bleeding noted upon exam.  Discussed that if this were to occur again patient can try ice water gargles to help with bleeding cessation.  I did also encourage patient to reach out to the ENT office for further guidance but if patient has no other complaints and bleeding has subsided he is okay to be discharged.  Patient and significant other were educated on concerning signs and symptoms that would warrant a quick return to the ED and verbalized understanding of this. I answered all the questions regarding the emergency department evaluation, diagnosis, and treatment plan in plain and simple language that was understandable. We discussed that due to always having some diagnostic uncertainty while in the ER, there is always a chance that symptoms may change or new symptoms may reveal themselves after being discharged. Because of this, I stressed the importance of Pierre following up with their PCP and ENT. Patient informed that appointment will need to be done by calling their office to set up an appointment within the next few days or as soon as reasonably possible so that the  symptoms can be re-evaluated for improvement or for any other questions. I also gave Pierre common sense return precautions and prompted patient to return to the emergency department within 24 - 48hrs if there are any new, worsening, or concerning symptoms. The patient verbalized understanding of the discharge instructions and agreed with them. Pierre was discharged in stable condition.     Dragon disclaimer:  Parts of this note may be an electronic transcription/translation of spoken language to printed text using the Dragon dictation system.    Problems Addressed:  Bleeding: acute illness or injury  S/P tonsillectomy: acute illness or injury        Final diagnoses:   S/P tonsillectomy   Bleeding       ED Disposition  ED Disposition       ED Disposition   Discharge    Condition   Stable    Comment   --               Kathy Isabel, APRN  [address] [facility] [address] [phone]    Schedule an appointment as soon as possible for a visit       Chris Valenzuela MD  [address] [facility]   [address]  [facility] [address]  [phone]    Schedule an appointment as soon as possible for a visit       [facility] EMERGENCY DEPARTMENT  [facility] [place] [id number] [phone]    If symptoms worsen         Medication List      No changes were made to your prescriptions during this visit.            Colleen Bills, APRN  [phone]

## 2024-10-03 ENCOUNTER — TELEPHONE (OUTPATIENT)
Dept: OTOLARYNGOLOGY | Facility: CLINIC | Age: 29
End: 2024-10-03
Payer: MEDICAID

## 2024-10-03 NOTE — TELEPHONE ENCOUNTER
I have called patient and he is doing well. He states has a small scab or piece of tissue on the area beside his uvula. I told him to give it one week and if it is still there he will need to be seen. Patient has no dysphagia or nasal regurgitation.

## 2024-10-03 NOTE — TELEPHONE ENCOUNTER
----- Message from Kortney THOMPSON sent at 7/25/2024  2:00 PM CDT -----  Regarding: FW: 9/9  [facility]  ----- Message -----  From: Kate Francisco  Sent: 7/25/2024   1:29 PM CDT  To: Kortney Luna MA  Subject: 9/9                                                ----- Message -----  From: Kortney Luna MA  Sent: 7/25/2024   9:50 AM CDT  To: Kate Francisco    9/9

## (undated) DEVICE — 4-PORT MANIFOLD: Brand: NEPTUNE 2

## (undated) DEVICE — PAD T&A PACK: Brand: MEDLINE INDUSTRIES, INC.

## (undated) DEVICE — POSITIONER,HEAD,MULTIRING,36CS: Brand: MEDLINE

## (undated) DEVICE — GLV SURG BIOGEL M LTX PF 7 1/2

## (undated) DEVICE — DRSNG WND MATRX ENT SEALK ADV/HEMO 1X2IN